# Patient Record
Sex: FEMALE | Race: WHITE | NOT HISPANIC OR LATINO
[De-identification: names, ages, dates, MRNs, and addresses within clinical notes are randomized per-mention and may not be internally consistent; named-entity substitution may affect disease eponyms.]

---

## 2017-01-11 ENCOUNTER — APPOINTMENT (OUTPATIENT)
Dept: FAMILY MEDICINE | Facility: CLINIC | Age: 77
End: 2017-01-11

## 2017-01-11 VITALS
SYSTOLIC BLOOD PRESSURE: 144 MMHG | HEART RATE: 86 BPM | BODY MASS INDEX: 31.15 KG/M2 | DIASTOLIC BLOOD PRESSURE: 86 MMHG | TEMPERATURE: 98.2 F | OXYGEN SATURATION: 99 % | HEIGHT: 61 IN | WEIGHT: 165 LBS

## 2017-01-23 ENCOUNTER — APPOINTMENT (OUTPATIENT)
Dept: FAMILY MEDICINE | Facility: CLINIC | Age: 77
End: 2017-01-23

## 2017-08-10 ENCOUNTER — APPOINTMENT (OUTPATIENT)
Dept: FAMILY MEDICINE | Facility: CLINIC | Age: 77
End: 2017-08-10
Payer: MEDICARE

## 2017-08-10 VITALS
BODY MASS INDEX: 31.34 KG/M2 | HEIGHT: 61 IN | WEIGHT: 166 LBS | HEART RATE: 70 BPM | SYSTOLIC BLOOD PRESSURE: 148 MMHG | DIASTOLIC BLOOD PRESSURE: 90 MMHG | OXYGEN SATURATION: 98 %

## 2017-08-10 PROCEDURE — 99214 OFFICE O/P EST MOD 30 MIN: CPT

## 2017-08-10 RX ORDER — CHLORHEXIDINE GLUCONATE, 0.12% ORAL RINSE 1.2 MG/ML
0.12 SOLUTION DENTAL
Qty: 473 | Refills: 0 | Status: COMPLETED | COMMUNITY
Start: 2017-05-04

## 2017-08-10 RX ORDER — AMOXICILLIN 875 MG/1
875 TABLET, FILM COATED ORAL
Qty: 14 | Refills: 0 | Status: COMPLETED | COMMUNITY
Start: 2017-05-04

## 2017-08-14 ENCOUNTER — MEDICATION RENEWAL (OUTPATIENT)
Age: 77
End: 2017-08-14

## 2017-08-14 DIAGNOSIS — M79.2 NEURALGIA AND NEURITIS, UNSPECIFIED: ICD-10-CM

## 2017-08-18 ENCOUNTER — APPOINTMENT (OUTPATIENT)
Dept: FAMILY MEDICINE | Facility: CLINIC | Age: 77
End: 2017-08-18
Payer: MEDICARE

## 2017-08-18 VITALS
OXYGEN SATURATION: 95 % | BODY MASS INDEX: 31.53 KG/M2 | HEART RATE: 67 BPM | HEIGHT: 61 IN | DIASTOLIC BLOOD PRESSURE: 76 MMHG | SYSTOLIC BLOOD PRESSURE: 130 MMHG | WEIGHT: 167 LBS

## 2017-08-18 VITALS — DIASTOLIC BLOOD PRESSURE: 70 MMHG | SYSTOLIC BLOOD PRESSURE: 105 MMHG

## 2017-08-18 PROCEDURE — 99214 OFFICE O/P EST MOD 30 MIN: CPT

## 2017-08-21 ENCOUNTER — APPOINTMENT (OUTPATIENT)
Dept: RADIOLOGY | Facility: CLINIC | Age: 77
End: 2017-08-21
Payer: MEDICARE

## 2017-08-21 ENCOUNTER — OUTPATIENT (OUTPATIENT)
Dept: OUTPATIENT SERVICES | Facility: HOSPITAL | Age: 77
LOS: 1 days | End: 2017-08-21
Payer: MEDICARE

## 2017-08-21 ENCOUNTER — APPOINTMENT (OUTPATIENT)
Dept: ULTRASOUND IMAGING | Facility: CLINIC | Age: 77
End: 2017-08-21
Payer: MEDICARE

## 2017-08-21 DIAGNOSIS — Z00.8 ENCOUNTER FOR OTHER GENERAL EXAMINATION: ICD-10-CM

## 2017-08-21 DIAGNOSIS — R10.9 UNSPECIFIED ABDOMINAL PAIN: ICD-10-CM

## 2017-08-21 DIAGNOSIS — R07.81 PLEURODYNIA: ICD-10-CM

## 2017-08-21 PROCEDURE — 71100 X-RAY EXAM RIBS UNI 2 VIEWS: CPT | Mod: 26,RT

## 2017-08-21 PROCEDURE — 76700 US EXAM ABDOM COMPLETE: CPT

## 2017-08-21 PROCEDURE — 71100 X-RAY EXAM RIBS UNI 2 VIEWS: CPT

## 2017-08-21 PROCEDURE — 76700 US EXAM ABDOM COMPLETE: CPT | Mod: 26

## 2017-08-22 ENCOUNTER — MEDICATION RENEWAL (OUTPATIENT)
Age: 77
End: 2017-08-22

## 2017-08-31 ENCOUNTER — APPOINTMENT (OUTPATIENT)
Dept: FAMILY MEDICINE | Facility: CLINIC | Age: 77
End: 2017-08-31
Payer: MEDICARE

## 2017-08-31 VITALS
HEIGHT: 61 IN | SYSTOLIC BLOOD PRESSURE: 122 MMHG | BODY MASS INDEX: 31.53 KG/M2 | HEART RATE: 72 BPM | WEIGHT: 167 LBS | DIASTOLIC BLOOD PRESSURE: 80 MMHG | OXYGEN SATURATION: 95 %

## 2017-08-31 DIAGNOSIS — N28.9 DISORDER OF KIDNEY AND URETER, UNSPECIFIED: ICD-10-CM

## 2017-08-31 PROCEDURE — 99214 OFFICE O/P EST MOD 30 MIN: CPT | Mod: 25

## 2017-08-31 PROCEDURE — 36415 COLL VENOUS BLD VENIPUNCTURE: CPT

## 2017-08-31 RX ORDER — TRAMADOL HYDROCHLORIDE 50 MG/1
50 TABLET, COATED ORAL
Qty: 30 | Refills: 0 | Status: COMPLETED | COMMUNITY
Start: 2017-08-14 | End: 2017-08-31

## 2017-08-31 RX ORDER — VALACYCLOVIR 1 G/1
1 TABLET, FILM COATED ORAL 3 TIMES DAILY
Qty: 21 | Refills: 0 | Status: COMPLETED | COMMUNITY
Start: 2017-08-10 | End: 2017-08-31

## 2017-08-31 RX ORDER — PREDNISONE 20 MG/1
20 TABLET ORAL
Qty: 7 | Refills: 0 | Status: COMPLETED | COMMUNITY
Start: 2017-08-22 | End: 2017-08-31

## 2017-09-01 LAB
25(OH)D3 SERPL-MCNC: 18.6 NG/ML
ALBUMIN SERPL ELPH-MCNC: 3.5 G/DL
ALP BLD-CCNC: 115 U/L
ALT SERPL-CCNC: 9 U/L
ANION GAP SERPL CALC-SCNC: 15 MMOL/L
AST SERPL-CCNC: 15 U/L
BILIRUB SERPL-MCNC: 0.5 MG/DL
BUN SERPL-MCNC: 17 MG/DL
CALCIUM SERPL-MCNC: 8.6 MG/DL
CHLORIDE SERPL-SCNC: 105 MMOL/L
CHOLEST SERPL-MCNC: 227 MG/DL
CHOLEST/HDLC SERPL: 4.9 RATIO
CO2 SERPL-SCNC: 21 MMOL/L
CREAT SERPL-MCNC: 1.27 MG/DL
GGT SERPL-CCNC: 11 U/L
GLUCOSE SERPL-MCNC: 235 MG/DL
HDLC SERPL-MCNC: 46 MG/DL
LDLC SERPL CALC-MCNC: 147 MG/DL
POTASSIUM SERPL-SCNC: 4.1 MMOL/L
PROT SERPL-MCNC: 6.6 G/DL
SODIUM SERPL-SCNC: 141 MMOL/L
T3 SERPL-MCNC: 82 NG/DL
T4 SERPL-MCNC: 6.2 UG/DL
TRIGL SERPL-MCNC: 171 MG/DL
TSH SERPL-ACNC: 1.47 UIU/ML
URATE SERPL-MCNC: 4.9 MG/DL

## 2017-09-11 LAB
BASOPHILS # BLD AUTO: 0.05 K/UL
BASOPHILS NFR BLD AUTO: 0.8 %
EOSINOPHIL # BLD AUTO: 0.28 K/UL
EOSINOPHIL NFR BLD AUTO: 4.4 %
HCT VFR BLD CALC: 39.9 %
HGB BLD-MCNC: 11.8 G/DL
IMM GRANULOCYTES NFR BLD AUTO: 0.2 %
LYMPHOCYTES # BLD AUTO: 1.57 K/UL
LYMPHOCYTES NFR BLD AUTO: 24.7 %
MAN DIFF?: NORMAL
MCHC RBC-ENTMCNC: 26.7 PG
MCHC RBC-ENTMCNC: 29.6 GM/DL
MCV RBC AUTO: 90.3 FL
MONOCYTES # BLD AUTO: 0.47 K/UL
MONOCYTES NFR BLD AUTO: 7.4 %
NEUTROPHILS # BLD AUTO: 3.97 K/UL
NEUTROPHILS NFR BLD AUTO: 62.5 %
PLATELET # BLD AUTO: 238 K/UL
RBC # BLD: 4.42 M/UL
RBC # FLD: 16 %
WBC # FLD AUTO: 6.35 K/UL

## 2017-09-27 ENCOUNTER — RX RENEWAL (OUTPATIENT)
Age: 77
End: 2017-09-27

## 2017-11-01 ENCOUNTER — APPOINTMENT (OUTPATIENT)
Dept: FAMILY MEDICINE | Facility: CLINIC | Age: 77
End: 2017-11-01
Payer: MEDICARE

## 2017-11-01 VITALS
DIASTOLIC BLOOD PRESSURE: 76 MMHG | HEART RATE: 66 BPM | HEIGHT: 61 IN | BODY MASS INDEX: 30.58 KG/M2 | OXYGEN SATURATION: 98 % | SYSTOLIC BLOOD PRESSURE: 126 MMHG | WEIGHT: 162 LBS

## 2017-11-01 PROCEDURE — 36415 COLL VENOUS BLD VENIPUNCTURE: CPT

## 2017-11-01 PROCEDURE — 99214 OFFICE O/P EST MOD 30 MIN: CPT | Mod: 25

## 2017-11-02 LAB — HBA1C MFR BLD HPLC: 5.8 %

## 2017-11-03 ENCOUNTER — APPOINTMENT (OUTPATIENT)
Dept: FAMILY MEDICINE | Facility: CLINIC | Age: 77
End: 2017-11-03

## 2017-11-14 ENCOUNTER — APPOINTMENT (OUTPATIENT)
Dept: FAMILY MEDICINE | Facility: CLINIC | Age: 77
End: 2017-11-14
Payer: MEDICARE

## 2017-11-14 VITALS
BODY MASS INDEX: 29.83 KG/M2 | OXYGEN SATURATION: 98 % | DIASTOLIC BLOOD PRESSURE: 80 MMHG | HEIGHT: 61 IN | HEART RATE: 68 BPM | SYSTOLIC BLOOD PRESSURE: 132 MMHG | WEIGHT: 158 LBS

## 2017-11-14 DIAGNOSIS — B02.29 OTHER POSTHERPETIC NERVOUS SYSTEM INVOLVEMENT: ICD-10-CM

## 2017-11-14 PROCEDURE — 99214 OFFICE O/P EST MOD 30 MIN: CPT

## 2017-11-14 RX ORDER — VALACYCLOVIR 1 G/1
1 TABLET, FILM COATED ORAL 3 TIMES DAILY
Qty: 21 | Refills: 0 | Status: COMPLETED | COMMUNITY
Start: 2017-11-01 | End: 2017-11-14

## 2017-11-14 RX ORDER — METHYLPREDNISOLONE 4 MG/1
4 TABLET ORAL
Qty: 1 | Refills: 0 | Status: COMPLETED | COMMUNITY
Start: 2017-11-01 | End: 2017-11-14

## 2017-12-24 ENCOUNTER — RX RENEWAL (OUTPATIENT)
Age: 77
End: 2017-12-24

## 2018-01-23 ENCOUNTER — APPOINTMENT (OUTPATIENT)
Dept: FAMILY MEDICINE | Facility: CLINIC | Age: 78
End: 2018-01-23
Payer: MEDICARE

## 2018-01-23 ENCOUNTER — RX RENEWAL (OUTPATIENT)
Age: 78
End: 2018-01-23

## 2018-01-23 VITALS
SYSTOLIC BLOOD PRESSURE: 138 MMHG | HEIGHT: 61 IN | OXYGEN SATURATION: 94 % | BODY MASS INDEX: 29.83 KG/M2 | WEIGHT: 158 LBS | TEMPERATURE: 98.5 F | DIASTOLIC BLOOD PRESSURE: 70 MMHG | HEART RATE: 73 BPM

## 2018-01-23 PROCEDURE — 99214 OFFICE O/P EST MOD 30 MIN: CPT

## 2018-01-23 RX ORDER — PREGABALIN 50 MG/1
50 CAPSULE ORAL
Qty: 30 | Refills: 0 | Status: DISCONTINUED | COMMUNITY
Start: 2017-11-14 | End: 2018-01-23

## 2018-03-26 ENCOUNTER — RX RENEWAL (OUTPATIENT)
Age: 78
End: 2018-03-26

## 2018-06-14 ENCOUNTER — RX RENEWAL (OUTPATIENT)
Age: 78
End: 2018-06-14

## 2018-07-16 ENCOUNTER — APPOINTMENT (OUTPATIENT)
Dept: MRI IMAGING | Facility: CLINIC | Age: 78
End: 2018-07-16

## 2018-07-16 ENCOUNTER — OUTPATIENT (OUTPATIENT)
Dept: OUTPATIENT SERVICES | Facility: HOSPITAL | Age: 78
LOS: 1 days | End: 2018-07-16
Payer: MEDICARE

## 2018-07-16 DIAGNOSIS — Z00.8 ENCOUNTER FOR OTHER GENERAL EXAMINATION: ICD-10-CM

## 2018-07-16 PROCEDURE — 73721 MRI JNT OF LWR EXTRE W/O DYE: CPT | Mod: 26,RT

## 2018-07-16 PROCEDURE — 73721 MRI JNT OF LWR EXTRE W/O DYE: CPT

## 2018-07-23 ENCOUNTER — APPOINTMENT (OUTPATIENT)
Dept: FAMILY MEDICINE | Facility: CLINIC | Age: 78
End: 2018-07-23
Payer: MEDICARE

## 2018-07-23 VITALS
SYSTOLIC BLOOD PRESSURE: 116 MMHG | BODY MASS INDEX: 28.89 KG/M2 | DIASTOLIC BLOOD PRESSURE: 68 MMHG | WEIGHT: 153 LBS | TEMPERATURE: 97.5 F | HEART RATE: 81 BPM | HEIGHT: 61 IN | OXYGEN SATURATION: 98 %

## 2018-07-23 PROCEDURE — 99214 OFFICE O/P EST MOD 30 MIN: CPT

## 2018-07-23 RX ORDER — METHYLPREDNISOLONE 4 MG/1
4 TABLET ORAL
Qty: 1 | Refills: 0 | Status: DISCONTINUED | COMMUNITY
Start: 2018-01-23 | End: 2018-07-23

## 2018-07-23 RX ORDER — FLUTICASONE PROPIONATE 50 UG/1
50 SPRAY, METERED NASAL DAILY
Qty: 48 | Refills: 5 | Status: DISCONTINUED | COMMUNITY
Start: 2018-01-23 | End: 2018-07-23

## 2018-08-21 ENCOUNTER — APPOINTMENT (OUTPATIENT)
Dept: FAMILY MEDICINE | Facility: CLINIC | Age: 78
End: 2018-08-21
Payer: MEDICARE

## 2018-08-21 VITALS
WEIGHT: 156 LBS | SYSTOLIC BLOOD PRESSURE: 118 MMHG | DIASTOLIC BLOOD PRESSURE: 70 MMHG | BODY MASS INDEX: 29.45 KG/M2 | TEMPERATURE: 98.1 F | HEART RATE: 60 BPM | HEIGHT: 61 IN | OXYGEN SATURATION: 97 %

## 2018-08-21 PROCEDURE — 99214 OFFICE O/P EST MOD 30 MIN: CPT

## 2018-08-22 NOTE — PLAN
[FreeTextEntry1] : recommend oral antihistamine nightly call if cough has not resolved in the next 10-14 days

## 2018-08-22 NOTE — PHYSICAL EXAM

## 2018-08-22 NOTE — HISTORY OF PRESENT ILLNESS
[FreeTextEntry8] : worsening cough [id number] on/off \par \par throaty cough for just over a month\par took antibiotic but no relief\par feels some tightness in throat\par clear phlegm\par used nasal srpay but felt it made cough worse\par no fever or chills\par no dypnea

## 2018-09-13 ENCOUNTER — RX RENEWAL (OUTPATIENT)
Age: 78
End: 2018-09-13

## 2018-11-27 ENCOUNTER — NON-APPOINTMENT (OUTPATIENT)
Age: 78
End: 2018-11-27

## 2018-11-27 ENCOUNTER — APPOINTMENT (OUTPATIENT)
Dept: FAMILY MEDICINE | Facility: CLINIC | Age: 78
End: 2018-11-27
Payer: MEDICARE

## 2018-11-27 VITALS
BODY MASS INDEX: 31.34 KG/M2 | SYSTOLIC BLOOD PRESSURE: 124 MMHG | OXYGEN SATURATION: 98 % | HEIGHT: 61 IN | DIASTOLIC BLOOD PRESSURE: 76 MMHG | WEIGHT: 166 LBS | HEART RATE: 72 BPM

## 2018-11-27 DIAGNOSIS — Z86.79 PERSONAL HISTORY OF OTHER DISEASES OF THE CIRCULATORY SYSTEM: ICD-10-CM
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
## 2018-11-27 DIAGNOSIS — Z23 ENCOUNTER FOR IMMUNIZATION: ICD-10-CM

## 2018-11-27 DIAGNOSIS — R07.81 PLEURODYNIA: ICD-10-CM

## 2018-11-27 DIAGNOSIS — Z86.19 PERSONAL HISTORY OF OTHER INFECTIOUS AND PARASITIC DISEASES: ICD-10-CM

## 2018-11-27 DIAGNOSIS — R42 DIZZINESS AND GIDDINESS: ICD-10-CM

## 2018-11-27 DIAGNOSIS — Z87.898 PERSONAL HISTORY OF OTHER SPECIFIED CONDITIONS: ICD-10-CM

## 2018-11-27 DIAGNOSIS — Z87.09 PERSONAL HISTORY OF OTHER DISEASES OF THE RESPIRATORY SYSTEM: ICD-10-CM

## 2018-11-27 DIAGNOSIS — H60.90 UNSPECIFIED OTITIS EXTERNA, UNSPECIFIED EAR: ICD-10-CM

## 2018-11-27 PROCEDURE — 93000 ELECTROCARDIOGRAM COMPLETE: CPT

## 2018-11-27 PROCEDURE — 90670 PCV13 VACCINE IM: CPT

## 2018-11-27 PROCEDURE — G0439: CPT

## 2018-11-27 PROCEDURE — G0009: CPT

## 2018-11-27 PROCEDURE — 36415 COLL VENOUS BLD VENIPUNCTURE: CPT

## 2018-11-27 RX ORDER — BENZONATATE 100 MG/1
100 CAPSULE ORAL EVERY 8 HOURS
Qty: 30 | Refills: 1 | Status: DISCONTINUED | COMMUNITY
Start: 2018-01-23 | End: 2018-11-27

## 2018-11-27 RX ORDER — NYSTATIN 100000 [USP'U]/ML
100000 SUSPENSION ORAL 3 TIMES DAILY
Qty: 1 | Refills: 1 | Status: DISCONTINUED | COMMUNITY
Start: 2018-08-21 | End: 2018-11-27

## 2018-11-27 RX ORDER — AZITHROMYCIN 250 MG/1
250 TABLET, FILM COATED ORAL
Qty: 1 | Refills: 0 | Status: DISCONTINUED | COMMUNITY
Start: 2018-07-23 | End: 2018-11-27

## 2018-11-28 LAB
25(OH)D3 SERPL-MCNC: 12.9 NG/ML
CHOLEST SERPL-MCNC: 223 MG/DL
CHOLEST/HDLC SERPL: 3.6 RATIO
HBA1C MFR BLD HPLC: 6 %
HCV AB SER QL: NONREACTIVE
HCV S/CO RATIO: 0.14 S/CO
HDLC SERPL-MCNC: 62 MG/DL
LDLC SERPL CALC-MCNC: 138 MG/DL
TRIGL SERPL-MCNC: 117 MG/DL
TSH SERPL-ACNC: 2.41 UIU/ML
URATE SERPL-MCNC: 4.7 MG/DL

## 2018-11-28 NOTE — HEALTH RISK ASSESSMENT
[Good] : ~his/her~  mood as  good [No falls in past year] : Patient reported no falls in the past year [0] : 2) Feeling down, depressed, or hopeless: Not at all (0) [None] : None [With Family] : lives with family [Retired] : retired [Designated Healthcare Proxy] : Designated healthcare proxy [Name: ___] : Health Care Proxy's Name: [unfilled]  [Relationship: ___] : Relationship: [unfilled] [] : No [de-identified] : Dr Lal (ophth) Dr. Castillo (podiatry) [de-identified] : very rare [Change in mental status noted] : No change in mental status noted

## 2018-11-28 NOTE — HISTORY OF PRESENT ILLNESS
[FreeTextEntry1] : [facility] wellness exam  [de-identified] : 78 year old female here for annual wellness exam\par she fells well\par she lives with her daughter\par still drives\par memory and mood are good\par

## 2018-11-28 NOTE — PHYSICAL EXAM

## 2018-11-29 ENCOUNTER — OTHER (OUTPATIENT)
Age: 78
End: 2018-11-29

## 2018-12-21 ENCOUNTER — RX RENEWAL (OUTPATIENT)
Age: 78
End: 2018-12-21

## 2019-03-22 ENCOUNTER — RX RENEWAL (OUTPATIENT)
Age: 79
End: 2019-03-22

## 2019-06-20 ENCOUNTER — OTHER (OUTPATIENT)
Age: 79
End: 2019-06-20

## 2019-06-24 ENCOUNTER — APPOINTMENT (OUTPATIENT)
Dept: ORTHOPEDIC SURGERY | Facility: CLINIC | Age: 79
End: 2019-06-24
Payer: MEDICARE

## 2019-06-24 VITALS
DIASTOLIC BLOOD PRESSURE: 90 MMHG | HEART RATE: 60 BPM | SYSTOLIC BLOOD PRESSURE: 176 MMHG | HEIGHT: 61 IN | WEIGHT: 162 LBS | BODY MASS INDEX: 30.58 KG/M2

## 2019-06-24 PROCEDURE — 73590 X-RAY EXAM OF LOWER LEG: CPT | Mod: LT

## 2019-06-24 PROCEDURE — 99203 OFFICE O/P NEW LOW 30 MIN: CPT

## 2019-06-24 NOTE — DISCUSSION/SUMMARY
[de-identified] : Jyothi is an 79-year-old female with left leg pain secondary to tibialis anterior strain. Today we had a thorough discussion regarding the nature of her symptoms as well as all treatment options. We discussed both operative and nonoperative management. At this time she elects for further nonoperative management with an oral anti-inflammatory and physical therapy. I gave her prescription for meloxicam once daily for 21 days as well as a prescription for physical therapy. I will see her back in 4-6 weeks. If she has no improvement of symptoms we may consider an MRI. She pushes good understanding of the plan and all questions were answered.

## 2019-06-24 NOTE — CONSULT LETTER
[Dear  ___] : Dear  [unfilled], [Consult Letter:] : I had the pleasure of evaluating your patient, [unfilled]. [Please see my note below.] : Please see my note below. [Consult Closing:] : Thank you very much for allowing me to participate in the care of this patient.  If you have any questions, please do not hesitate to contact me. [Sincerely,] : Sincerely, [FreeTextEntry2] : Olive Hughes MD [FreeTextEntry3] : Mitchell Coates DO.\par Sports Medicine \par \par Orthopaedic Surgery\par \par [facility] @ [facility]\par \par [address] \par [address]\[place] [address]\par \par [address] [facility] \[address] \[place] [address]\par \par Tel [phone]\par Fax [phone]\par \par

## 2019-06-24 NOTE — PHYSICAL EXAM
[de-identified] : \par The following radiographs were ordered and read by me during this patients visit. I reviewed each radiograph in detail with the patient and discussed the findings as highlighted below. \par \par 3 views of the tibia and fibula were obtained today that show no fracture, dislocation. There is no degenerative change seen. There is no malalignment. No obvious osseous abnormality. Otherwise unremarkable. [de-identified] : Physical Exam:\par General: Well appearing, no acute distress, A&O\par Neurologic: A&Ox3, No focal deficits\par Head: NCAT without abrasions, lacerations, or ecchymosis to head, face, or scalp\par Eyes: No scleral icterus, no gross abnormalities\par Respiratory: Equal chest wall expansion bilaterally, no accessory muscle use\par Lymphatic: No lymphadenopathy palpated\par Skin: Warm and dry\par Psychiatric: Normal mood and affect\par Left lower extremity: Incision site over knee. Good healing scar over total knee replacement incision. Tenderness and swelling noted at the tibialis anterior muscle belly. No tenderness or swelling noted at the distal portion of the tibialis anterior. Full range of motion of ankle and knee. No swelling noted ankle or knee. No erythema. Knee is stable. Stable ankle exam. No no pain with eversion or inversion. There is pain with dorsiflexion and plantar flexion. Motor and sensation are intact. Dorsalis pedis 2+

## 2019-06-24 NOTE — HISTORY OF PRESENT ILLNESS
[Improving] : improving [___ mths] : [unfilled] month(s) ago [8] : an average pain level of 8/10 [9] : a maximum pain level of 9/10 [Constant] : ~He/She~ states the symptoms seem to be constant [Bending] : worsened by bending [Sitting] : worsened by sitting [Walking] : worsened by walking [None] : No relieving factors are noted [de-identified] : Jyothi is an 79 year old female who presents for initial evaluation of left lower leg pain.  She saw an orthopedic for this issue who sent her to physical therapy.  Her pain has not improved with 2 mos. of physical therapy.  She had an US to rule out DVT.She states that she feels 50% improved since her last visit with orthopedic surgeon. She still does have pain over the tibialis anterior muscle. She feels as if she is unstable on her legs. [de-identified] : lying down

## 2019-06-24 NOTE — REVIEW OF SYSTEMS
[Joint Pain] : joint pain [Joint Stiffness] : joint stiffness [Joint Swelling] : joint swelling [FreeTextEntry9] : left leg [Negative] : Heme/Lymph

## 2019-07-10 ENCOUNTER — APPOINTMENT (OUTPATIENT)
Dept: FAMILY MEDICINE | Facility: CLINIC | Age: 79
End: 2019-07-10
Payer: MEDICARE

## 2019-07-10 VITALS — SYSTOLIC BLOOD PRESSURE: 138 MMHG | DIASTOLIC BLOOD PRESSURE: 80 MMHG

## 2019-07-10 VITALS
OXYGEN SATURATION: 98 % | DIASTOLIC BLOOD PRESSURE: 74 MMHG | TEMPERATURE: 97.5 F | WEIGHT: 168 LBS | BODY MASS INDEX: 31.72 KG/M2 | HEART RATE: 66 BPM | HEIGHT: 61 IN | SYSTOLIC BLOOD PRESSURE: 162 MMHG

## 2019-07-10 PROCEDURE — 99214 OFFICE O/P EST MOD 30 MIN: CPT

## 2019-07-10 RX ORDER — METHYLPREDNISOLONE 4 MG/1
4 TABLET ORAL
Qty: 1 | Refills: 0 | Status: COMPLETED | COMMUNITY
Start: 2019-06-24 | End: 2019-07-10

## 2019-07-10 NOTE — ADDENDUM
[FreeTextEntry1] : I, Darling Nieto acting as a scribe for Dr. Olive Hughes on Jul 10, 2019  at 12:02 PM\par

## 2019-07-10 NOTE — PLAN
[FreeTextEntry1] : - Continue Meloxicam for inflammation\par - Continue stretching/ice\par - Vitamin D refill ordered\par - Follow up in four months

## 2019-07-10 NOTE — END OF VISIT
[FreeTextEntry3] : Medical record entries made by the scribe today today, were at my direction and personally dictated to them by me, Dr. Olive Hughes on Jul 10, 2019. I have reviewed the chart and agree that the record accurately reflects my personal performance of the history, physical exam, assessment, and plan.\par \par

## 2019-07-10 NOTE — HISTORY OF PRESENT ILLNESS
[FreeTextEntry1] : F/u on HTN, constant left leg pain  [de-identified] : ADILSON is a 79 year female here for f/u on HTN. BP today reads 162/74. She attributes a high pressure to weight gain. Currently taking Amlodipine 5 mg, Metoprolol 25 mg. Pt currently presents c/o constant LT leg pain. F/u with  for leg pain. All tests were unremarkable, he feels it may be tendonitis. Pt reports that Meloxicam is not helping. Steroids was the only treatment that provided relief. States she has been stretching, using heat/ice. Reports that she cant stand/sit/walk too long.

## 2019-07-12 ENCOUNTER — FORM ENCOUNTER (OUTPATIENT)
Age: 79
End: 2019-07-12

## 2019-07-13 ENCOUNTER — OUTPATIENT (OUTPATIENT)
Dept: OUTPATIENT SERVICES | Facility: HOSPITAL | Age: 79
LOS: 1 days | End: 2019-07-13
Payer: MEDICARE

## 2019-07-13 ENCOUNTER — APPOINTMENT (OUTPATIENT)
Dept: MRI IMAGING | Facility: CLINIC | Age: 79
End: 2019-07-13
Payer: MEDICARE

## 2019-07-13 DIAGNOSIS — Z00.8 ENCOUNTER FOR OTHER GENERAL EXAMINATION: ICD-10-CM

## 2019-07-13 DIAGNOSIS — M76.812 ANTERIOR TIBIAL SYNDROME, LEFT LEG: ICD-10-CM

## 2019-07-13 PROCEDURE — 73718 MRI LOWER EXTREMITY W/O DYE: CPT

## 2019-07-13 PROCEDURE — 73718 MRI LOWER EXTREMITY W/O DYE: CPT | Mod: 26,LT

## 2019-08-12 ENCOUNTER — APPOINTMENT (OUTPATIENT)
Dept: ORTHOPEDIC SURGERY | Facility: CLINIC | Age: 79
End: 2019-08-12
Payer: MEDICARE

## 2019-08-12 VITALS
HEART RATE: 70 BPM | HEIGHT: 61 IN | SYSTOLIC BLOOD PRESSURE: 159 MMHG | BODY MASS INDEX: 31.72 KG/M2 | DIASTOLIC BLOOD PRESSURE: 84 MMHG | WEIGHT: 168 LBS

## 2019-08-12 PROCEDURE — 99213 OFFICE O/P EST LOW 20 MIN: CPT

## 2019-09-17 ENCOUNTER — RX RENEWAL (OUTPATIENT)
Age: 79
End: 2019-09-17

## 2019-10-23 ENCOUNTER — APPOINTMENT (OUTPATIENT)
Dept: FAMILY MEDICINE | Facility: CLINIC | Age: 79
End: 2019-10-23
Payer: MEDICARE

## 2019-10-23 VITALS
HEIGHT: 61 IN | OXYGEN SATURATION: 98 % | DIASTOLIC BLOOD PRESSURE: 92 MMHG | TEMPERATURE: 97.8 F | WEIGHT: 172 LBS | BODY MASS INDEX: 32.47 KG/M2 | HEART RATE: 83 BPM | SYSTOLIC BLOOD PRESSURE: 144 MMHG

## 2019-10-23 PROCEDURE — 99214 OFFICE O/P EST MOD 30 MIN: CPT

## 2019-10-23 NOTE — ADDENDUM
[FreeTextEntry1] : I, Darling Nieto acting as a scribe for Dr. Olive Hughes on Oct 23, 2019  at 8:23 AM\par

## 2019-10-23 NOTE — HISTORY OF PRESENT ILLNESS
[FreeTextEntry8] : ADILSON is a 79 year female here c/o LT leg calf pain for the past two weeks. Pt reports that she had an MRI and followed up with  7/19. He encouraged her to stretch/exercise the leg and use of shoe inserts. About 2 weeks ago, she began experiencing severe pain. She is unable to walk well, can not lay down. Has tried Advil/CBD oil/ice/heat with no relief. Sharp, constant pain. Denies any triggers. Followed up with podiatrist in past for RT leg and was given brace.

## 2019-10-23 NOTE — PHYSICAL EXAM
[No Acute Distress] : no acute distress [Well Nourished] : well nourished [Well Developed] : well developed [PERRL] : pupils equal round and reactive to light [Normal Sclera/Conjunctiva] : normal sclera/conjunctiva [Well-Appearing] : well-appearing [Normal Outer Ear/Nose] : the outer ears and nose were normal in appearance [EOMI] : extraocular movements intact [No JVD] : no jugular venous distention [No Lymphadenopathy] : no lymphadenopathy [Normal Oropharynx] : the oropharynx was normal [Supple] : supple [Thyroid Normal, No Nodules] : the thyroid was normal and there were no nodules present [No Accessory Muscle Use] : no accessory muscle use [No Respiratory Distress] : no respiratory distress  [Clear to Auscultation] : lungs were clear to auscultation bilaterally [Normal Rate] : normal rate  [Regular Rhythm] : with a regular rhythm [Normal S1, S2] : normal S1 and S2 [No Murmur] : no murmur heard [No Carotid Bruits] : no carotid bruits [No Abdominal Bruit] : a ~M bruit was not heard ~T in the abdomen [No Varicosities] : no varicosities [Pedal Pulses Present] : the pedal pulses are present [No Edema] : there was no peripheral edema [No Extremity Clubbing/Cyanosis] : no extremity clubbing/cyanosis [No Palpable Aorta] : no palpable aorta [Soft] : abdomen soft [Non-distended] : non-distended [Non Tender] : non-tender [No Masses] : no abdominal mass palpated [No HSM] : no HSM [Normal Bowel Sounds] : normal bowel sounds [Normal Posterior Cervical Nodes] : no posterior cervical lymphadenopathy [Normal Anterior Cervical Nodes] : no anterior cervical lymphadenopathy [No CVA Tenderness] : no CVA  tenderness [No Spinal Tenderness] : no spinal tenderness [No Joint Swelling] : no joint swelling [No Rash] : no rash [Grossly Normal Strength/Tone] : grossly normal strength/tone [Normal Gait] : normal gait [Coordination Grossly Intact] : coordination grossly intact [No Focal Deficits] : no focal deficits [Deep Tendon Reflexes (DTR)] : deep tendon reflexes were 2+ and symmetric [Normal Affect] : the affect was normal [Normal Insight/Judgement] : insight and judgment were intact

## 2019-10-23 NOTE — END OF VISIT
[FreeTextEntry3] : Medical record entries made by the scribe today today, were at my direction and personally dictated to them by me, Dr. Olive Hughes on Oct 23, 2019. I have reviewed the chart and agree that the record accurately reflects my personal performance of the history, physical exam, assessment, and plan.\par \par

## 2019-10-23 NOTE — PLAN
[FreeTextEntry1] : - Blood work\par - Furosemide 20 mg ordered\par - Potassium ordered\par - Tramadol ordered \par - Follow up next week

## 2019-10-24 LAB
ALBUMIN SERPL ELPH-MCNC: 4 G/DL
ALP BLD-CCNC: 107 U/L
ALT SERPL-CCNC: 12 U/L
ANION GAP SERPL CALC-SCNC: 12 MMOL/L
AST SERPL-CCNC: 18 U/L
BASOPHILS # BLD AUTO: 0.05 K/UL
BASOPHILS NFR BLD AUTO: 0.9 %
BILIRUB SERPL-MCNC: 0.3 MG/DL
BUN SERPL-MCNC: 23 MG/DL
CALCIUM SERPL-MCNC: 9.2 MG/DL
CHLORIDE SERPL-SCNC: 109 MMOL/L
CO2 SERPL-SCNC: 20 MMOL/L
CREAT SERPL-MCNC: 1.37 MG/DL
EOSINOPHIL # BLD AUTO: 0.33 K/UL
EOSINOPHIL NFR BLD AUTO: 5.8 %
GLUCOSE SERPL-MCNC: 108 MG/DL
HCT VFR BLD CALC: 40.3 %
HGB BLD-MCNC: 12.1 G/DL
IMM GRANULOCYTES NFR BLD AUTO: 0.2 %
LYMPHOCYTES # BLD AUTO: 1.58 K/UL
LYMPHOCYTES NFR BLD AUTO: 27.6 %
MAN DIFF?: NORMAL
MCHC RBC-ENTMCNC: 28.1 PG
MCHC RBC-ENTMCNC: 30 GM/DL
MCV RBC AUTO: 93.7 FL
MONOCYTES # BLD AUTO: 0.54 K/UL
MONOCYTES NFR BLD AUTO: 9.4 %
NEUTROPHILS # BLD AUTO: 3.22 K/UL
NEUTROPHILS NFR BLD AUTO: 56.1 %
PLATELET # BLD AUTO: 222 K/UL
POTASSIUM SERPL-SCNC: 4.3 MMOL/L
PROT SERPL-MCNC: 6.4 G/DL
RBC # BLD: 4.3 M/UL
RBC # FLD: 14.6 %
SODIUM SERPL-SCNC: 141 MMOL/L
WBC # FLD AUTO: 5.73 K/UL

## 2019-10-30 ENCOUNTER — APPOINTMENT (OUTPATIENT)
Dept: FAMILY MEDICINE | Facility: CLINIC | Age: 79
End: 2019-10-30
Payer: MEDICARE

## 2019-10-30 VITALS
WEIGHT: 167 LBS | HEART RATE: 70 BPM | DIASTOLIC BLOOD PRESSURE: 90 MMHG | TEMPERATURE: 98 F | OXYGEN SATURATION: 98 % | BODY MASS INDEX: 31.53 KG/M2 | SYSTOLIC BLOOD PRESSURE: 142 MMHG | HEIGHT: 61 IN

## 2019-10-30 PROCEDURE — 36415 COLL VENOUS BLD VENIPUNCTURE: CPT

## 2019-10-30 PROCEDURE — 99214 OFFICE O/P EST MOD 30 MIN: CPT | Mod: 25

## 2019-10-30 NOTE — END OF VISIT
[FreeTextEntry3] : Medical record entries made by the scribe today today, were at my direction and personally dictated to them by me, Dr. Olive Hughes on Oct 30, 2019. I have reviewed the chart and agree that the record accurately reflects my personal performance of the history, physical exam, assessment, and plan.\par \par

## 2019-10-30 NOTE — PLAN
[FreeTextEntry1] : - Blood work today\par - Take Furosemide 40 mg a day until Sunday\par - Take Potassium x1 day\par - Percocet ordered \par - 11/4/19 return to Furosemide 20 mg\par - Weigh in QD\par - Call office and update with weights\par - Follow up next week

## 2019-10-30 NOTE — ADDENDUM
[FreeTextEntry1] : I, Darling Nieto acting as a scribe for Dr. Olive Hughes on Oct 30, 2019  at 9:22 AM\par

## 2019-10-30 NOTE — HISTORY OF PRESENT ILLNESS
[FreeTextEntry1] : 1 week f/u x left leg tendonitis, pt does not feel better   [de-identified] : ADILSON is a 79 year female here for f/u. Reports pain is still present, but not as bad. States 5/6 pain level. Has not taken Percocet today. Taking Percocet PRN, mostly in the afternoon due to it causing sleepiness. Taking Furosemide 20 mg daily and Potassium 20 meq daily. Reports she takes it in the morning. Following up with ortho 11/4/19. States that a week before the pain started, she was at an event and was up dancing for a few hours. Pt is wondering if that could have caused the pain. \par

## 2019-10-30 NOTE — REVIEW OF SYSTEMS
[Lower Ext Edema] : lower extremity edema [Joint Pain] : joint pain [Joint Swelling] : joint swelling [Muscle Pain] : muscle pain

## 2019-11-01 LAB
ANION GAP SERPL CALC-SCNC: 13 MMOL/L
BUN SERPL-MCNC: 18 MG/DL
CALCIUM SERPL-MCNC: 9.4 MG/DL
CHLORIDE SERPL-SCNC: 106 MMOL/L
CO2 SERPL-SCNC: 21 MMOL/L
CREAT SERPL-MCNC: 1.63 MG/DL
GLUCOSE SERPL-MCNC: 136 MG/DL
POTASSIUM SERPL-SCNC: 4.5 MMOL/L
SODIUM SERPL-SCNC: 140 MMOL/L

## 2019-11-04 ENCOUNTER — APPOINTMENT (OUTPATIENT)
Dept: ORTHOPEDIC SURGERY | Facility: CLINIC | Age: 79
End: 2019-11-04
Payer: MEDICARE

## 2019-11-04 VITALS
HEIGHT: 61 IN | BODY MASS INDEX: 31.53 KG/M2 | HEART RATE: 99 BPM | WEIGHT: 167 LBS | SYSTOLIC BLOOD PRESSURE: 123 MMHG | DIASTOLIC BLOOD PRESSURE: 75 MMHG

## 2019-11-04 PROCEDURE — 99213 OFFICE O/P EST LOW 20 MIN: CPT

## 2019-11-04 NOTE — DISCUSSION/SUMMARY
[de-identified] : Jyothi is an 79-year-old female who comes in for followup of her tibialis anterior pain. She recently received an MRI which shows inflammation over the peroneal muscles and tibialis anterior. At last evaluation I recommended continued physical therapy continued exercises. She does have pes planovalgus foot which is causing her irritation and tibialis anterior. I suggested over-the-counter orthotics at last evaluation which she has been wearing on a daily basis with minimal relief. Due to this, I recommended she see my partner, Dr Zhang for evaluation. I will see her back p.r.n. She gives us good understanding of the plan and all questions were answered.

## 2019-11-04 NOTE — PHYSICAL EXAM
[de-identified] : Physical Exam:\par General: Well appearing, no acute distress, A&O\par Neurologic: A&Ox3, No focal deficits\par Head: NCAT without abrasions, lacerations, or ecchymosis to head, face, or scalp\par Eyes: No scleral icterus, no gross abnormalities\par Respiratory: Equal chest wall expansion bilaterally, no accessory muscle use\par Lymphatic: No lymphadenopathy palpated\par Skin: Warm and dry\par Psychiatric: Normal mood and affect\par Left lower extremity: Incision site over knee. Good healing scar over total knee replacement incision. Tenderness and swelling noted at the tibialis anterior muscle belly. No tenderness or swelling noted at the distal portion of the tibialis anterior. Full range of motion of ankle and knee. No swelling noted ankle or knee. No erythema. Knee is stable. Stable ankle exam. No no pain with eversion or inversion. There is pain with dorsiflexion and plantar flexion. Motor and sensation are intact. Dorsalis pedis 2+ [de-identified] : \par The following radiographs were ordered and read by me during this patients visit. I reviewed each radiograph in detail with the patient and discussed the findings as highlighted below. \par \par 3 views of the tibia and fibula were obtained today that show no fracture, dislocation. There is no degenerative change seen. There is no malalignment. No obvious osseous abnormality. Otherwise unremarkable.

## 2019-11-04 NOTE — PHYSICAL EXAM
[de-identified] : Physical Exam:\par General: Well appearing, no acute distress, A&O\par Neurologic: A&Ox3, No focal deficits\par Head: NCAT without abrasions, lacerations, or ecchymosis to head, face, or scalp\par Eyes: No scleral icterus, no gross abnormalities\par Respiratory: Equal chest wall expansion bilaterally, no accessory muscle use\par Lymphatic: No lymphadenopathy palpated\par Skin: Warm and dry\par Psychiatric: Normal mood and affect\par \par Left lower extremity: Incision site over knee. Good healing scar over total knee replacement incision. Tenderness and swelling noted at the tibialis anterior muscle belly. No tenderness or swelling noted at the distal portion of the tibialis anterior. Full range of motion of ankle and knee. No swelling noted ankle or knee. No erythema. Knee is stable. Stable ankle exam. No no pain with eversion or inversion. There is pain with dorsiflexion and plantar flexion. Motor and sensation are intact. Dorsalis pedis 2+

## 2019-11-04 NOTE — HISTORY OF PRESENT ILLNESS
[Improving] : improving [Constant] : ~He/She~ states the symptoms seem to be constant [None] : No relieving factors are noted [de-identified] : Jyothi is an 79 year old female who presents for followup evaluation of left lower leg pain.  She saw an orthopedic for this issue who sent her to physical therapy.  Her pain has not improved with 2 mos. of physical therapy.  She had an US to rule out DVT.  She had an MRI at a [facility] imaging facility.  No evidence of muscle strain or tear of the proximal calf muscles.  Subcutaneous edema adjacent to the proximal peroneal and lateral gastrocnemius muscles. Mild faint edema within the distal peroneus musculature. \par \par Currently patient endorses improved pain from her initial evaluation.  She states that approximately 3-4 weeks ago she was dancing at a luncheon.  One week following, she endorsed increased left anterior tibialis pain and swelling.  Due to inability to get an immediate appointment with me she followed up with her PCP, Olive Miller.  Dr. Miller prescribed her a water pill which relieved her pain one week later.  She will followup with her again tomorrow.

## 2019-11-04 NOTE — REVIEW OF SYSTEMS
[Joint Pain] : joint pain [Joint Stiffness] : joint stiffness [Joint Swelling] : joint swelling [Negative] : Heme/Lymph [FreeTextEntry9] : left leg

## 2019-11-04 NOTE — DISCUSSION/SUMMARY
[de-identified] : Jyothi is an 79-year-old female who comes in for followup of her tibialis anterior pain. She recently received an MRI which shows inflammation over the peroneal muscles and tibialis anterior. At this time I recommend continued physical therapy continued exercises. She does have pes planovalgus foot which is causing her irritation and tibialis anterior. I suggested over-the-counter orthotics I gave her 3 options today. I will see her back p.r.n. She gives us good understanding of the plan and all questions were answered.

## 2019-11-04 NOTE — HISTORY OF PRESENT ILLNESS
[Improving] : improving [___ mths] : [unfilled] month(s) ago [8] : an average pain level of 8/10 [9] : a maximum pain level of 9/10 [Constant] : ~He/She~ states the symptoms seem to be constant [Bending] : worsened by bending [Sitting] : worsened by sitting [Walking] : worsened by walking [None] : No relieving factors are noted [de-identified] : Jyothi is an 79 year old female who presents for initial evaluation of left lower leg pain.  She saw an orthopedic for this issue who sent her to physical therapy.  Her pain has not improved with 2 mos. of physical therapy.  She had an US to rule out DVT. \par \par Patient endorses decreased lower extremity pain.  She had an MRI at a [facility] imaging facility.  No evidence of muscle strain or tear of the proximal calf muscles. \par  Subcutaneous edema adjacent to the proximal peroneal and lateral gastrocnemius muscles. Mild faint edema within the distal peroneus musculature.  [de-identified] : lying down

## 2019-11-05 ENCOUNTER — APPOINTMENT (OUTPATIENT)
Dept: FAMILY MEDICINE | Facility: CLINIC | Age: 79
End: 2019-11-05
Payer: MEDICARE

## 2019-11-05 VITALS
OXYGEN SATURATION: 98 % | HEIGHT: 61 IN | WEIGHT: 167 LBS | DIASTOLIC BLOOD PRESSURE: 80 MMHG | BODY MASS INDEX: 31.53 KG/M2 | SYSTOLIC BLOOD PRESSURE: 134 MMHG | HEART RATE: 69 BPM

## 2019-11-05 PROCEDURE — 99214 OFFICE O/P EST MOD 30 MIN: CPT

## 2019-11-05 NOTE — HISTORY OF PRESENT ILLNESS
[FreeTextEntry1] : follow up on LE edema  [de-identified] : ADILSON is a 79 year female here for f/u on LE edema. Followed up with ortho  11/4/19 and he encouraged orthotics which she has been using. Off note, he encouraged her to follow up with . LE edema has improved since last visit. Taking Furosemide 20 mg once day. Has not been taking Percocet.

## 2019-11-05 NOTE — END OF VISIT
[FreeTextEntry3] : Medical record entries made by the scribe today today, were at my direction and personally dictated to them by me, Dr. Olive Hughes on Nov 05, 2019. I have reviewed the chart and agree that the record accurately reflects my personal performance of the history, physical exam, assessment, and plan.\par \par

## 2019-11-05 NOTE — ADDENDUM
[FreeTextEntry1] : I, Darling Nieto acting as a scribe for Dr. Olive Hughes on Nov 05, 2019  at 10:26 AM\par

## 2019-11-05 NOTE — PLAN
[FreeTextEntry1] : - Take Furosemide 20 mg x2 day for one week \par - Take one Potassium daily\par - Weigh QD\par - Call office 11/11/19 with current weight\par - Follow up with Vascular Specialist \par - Vascular referral\par

## 2019-11-11 ENCOUNTER — RX RENEWAL (OUTPATIENT)
Age: 79
End: 2019-11-11

## 2019-11-25 ENCOUNTER — RX RENEWAL (OUTPATIENT)
Age: 79
End: 2019-11-25

## 2019-12-02 ENCOUNTER — APPOINTMENT (OUTPATIENT)
Dept: FAMILY MEDICINE | Facility: CLINIC | Age: 79
End: 2019-12-02
Payer: MEDICARE

## 2019-12-02 ENCOUNTER — NON-APPOINTMENT (OUTPATIENT)
Age: 79
End: 2019-12-02

## 2019-12-02 VITALS
BODY MASS INDEX: 30.78 KG/M2 | HEIGHT: 61 IN | OXYGEN SATURATION: 98 % | WEIGHT: 163 LBS | HEART RATE: 72 BPM | DIASTOLIC BLOOD PRESSURE: 76 MMHG | SYSTOLIC BLOOD PRESSURE: 128 MMHG

## 2019-12-02 DIAGNOSIS — Z13.820 ENCOUNTER FOR SCREENING FOR OSTEOPOROSIS: ICD-10-CM

## 2019-12-02 PROCEDURE — 93000 ELECTROCARDIOGRAM COMPLETE: CPT

## 2019-12-02 PROCEDURE — 36415 COLL VENOUS BLD VENIPUNCTURE: CPT

## 2019-12-02 PROCEDURE — G0439: CPT

## 2019-12-02 RX ORDER — TRAMADOL HYDROCHLORIDE 50 MG/1
50 TABLET, COATED ORAL
Qty: 30 | Refills: 0 | Status: COMPLETED | COMMUNITY
Start: 2019-10-23 | End: 2019-12-02

## 2019-12-02 NOTE — PHYSICAL EXAM
[No Acute Distress] : no acute distress [Well Nourished] : well nourished [Well Developed] : well developed [Well-Appearing] : well-appearing [PERRL] : pupils equal round and reactive to light [Normal Sclera/Conjunctiva] : normal sclera/conjunctiva [Normal Oropharynx] : the oropharynx was normal [EOMI] : extraocular movements intact [Normal Outer Ear/Nose] : the outer ears and nose were normal in appearance [No Lymphadenopathy] : no lymphadenopathy [No JVD] : no jugular venous distention [Thyroid Normal, No Nodules] : the thyroid was normal and there were no nodules present [Supple] : supple [No Accessory Muscle Use] : no accessory muscle use [No Respiratory Distress] : no respiratory distress  [Clear to Auscultation] : lungs were clear to auscultation bilaterally [Normal Rate] : normal rate  [Regular Rhythm] : with a regular rhythm [Normal S1, S2] : normal S1 and S2 [No Carotid Bruits] : no carotid bruits [No Murmur] : no murmur heard [No Abdominal Bruit] : a ~M bruit was not heard ~T in the abdomen [No Varicosities] : no varicosities [No Edema] : there was no peripheral edema [Pedal Pulses Present] : the pedal pulses are present [No Palpable Aorta] : no palpable aorta [No Extremity Clubbing/Cyanosis] : no extremity clubbing/cyanosis [Soft] : abdomen soft [Non Tender] : non-tender [Non-distended] : non-distended [No Masses] : no abdominal mass palpated [No HSM] : no HSM [Normal Bowel Sounds] : normal bowel sounds [Normal Posterior Cervical Nodes] : no posterior cervical lymphadenopathy [Normal Anterior Cervical Nodes] : no anterior cervical lymphadenopathy [No CVA Tenderness] : no CVA  tenderness [No Spinal Tenderness] : no spinal tenderness [Grossly Normal Strength/Tone] : grossly normal strength/tone [No Joint Swelling] : no joint swelling [No Rash] : no rash [No Focal Deficits] : no focal deficits [Coordination Grossly Intact] : coordination grossly intact [Normal Gait] : normal gait [Normal Affect] : the affect was normal [Deep Tendon Reflexes (DTR)] : deep tendon reflexes were 2+ and symmetric [Normal Insight/Judgement] : insight and judgment were intact

## 2019-12-02 NOTE — END OF VISIT
[FreeTextEntry3] : Medical record entries made by the scribe today today, were at my direction and personally dictated to them by me, Dr. Olive Hughes on Dec 02, 2019. I have reviewed the chart and agree that the record accurately reflects my personal performance of the history, physical exam, assessment, and plan.\par \par

## 2019-12-02 NOTE — HEALTH RISK ASSESSMENT
[Good] : ~his/her~  mood as  good [None] : None [Alone] : lives alone [Retired] : retired [Feels Safe at Home] : Feels safe at home [] :  [Fully functional (bathing, dressing, toileting, transferring, walking, feeding)] : Fully functional (bathing, dressing, toileting, transferring, walking, feeding) [Fully functional (using the telephone, shopping, preparing meals, housekeeping, doing laundry, using] : Fully functional and needs no help or supervision to perform IADLs (using the telephone, shopping, preparing meals, housekeeping, doing laundry, using transportation, managing medications and managing finances) [No falls in past year] : Patient reported no falls in the past year [Reports normal functional visual acuity (ie: able to read med bottle)] : Reports normal functional visual acuity [With Patient/Caregiver] : With Patient/Caregiver [Designated Healthcare Proxy] : Designated healthcare proxy [Name: ___] : Health Care Proxy's Name: [unfilled]  [Relationship: ___] : Relationship: [unfilled] [Reports changes in hearing] : Reports no changes in hearing [Reports changes in vision] : Reports no changes in vision [Reports changes in dental health] : Reports no changes in dental health [AdvancecareDate] : 12/19

## 2019-12-02 NOTE — REASON FOR VISIT
[Annual Wellness Visit] : an annual wellness visit [FreeTextEntry1] : [facility] wellness exam

## 2019-12-02 NOTE — HISTORY OF PRESENT ILLNESS
[FreeTextEntry1] : annual [de-identified] : ADILSON is a 79 year female here for [facility] wellness exam. Mood is good. Pt still c/o leg pain. Pt followed up with Vascular 11/21/19, all evaluations were normal. Has attended PT in the past with no relief. Denies skin tightness/edema. Takes Oxycodone for pain PRN. Currently taking Amlodipine 5 mg, Furosemide 20 mg, Metoprolol 25 mg, Potassium 20 MEQ daily. Pt reports a change in sleep patterns. States that sometimes during the day, she will fall asleep in the chair. She reports that occasionally at night, she is unable to fall asleep more times than not. She is a clock watcher, anticipatory anxiety. Does not use alarm clock because she is unable to hear it. States that she only uses one hearing aid in the RT ear. 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

## 2019-12-02 NOTE — ADDENDUM
[FreeTextEntry1] : I, Darling Nieto acting as a scribe for Dr. Olive Hughes on Dec 02, 2019  at 11:16 AM\par

## 2019-12-02 NOTE — PLAN
[FreeTextEntry1] : - Blood work today \par - Follow up with \par - Begin taking Furosemide q other day\par - Continue to weigh in to manage water retention\par - Pneumo 23 today\par - DEXA script \par - Handicap parking pass paperwork filled out

## 2019-12-03 LAB
25(OH)D3 SERPL-MCNC: 41.4 NG/ML
ALBUMIN SERPL ELPH-MCNC: 4.2 G/DL
ALP BLD-CCNC: 105 U/L
ALT SERPL-CCNC: 13 U/L
ANION GAP SERPL CALC-SCNC: 14 MMOL/L
APPEARANCE: CLEAR
AST SERPL-CCNC: 15 U/L
BACTERIA: NEGATIVE
BASOPHILS # BLD AUTO: 0.07 K/UL
BASOPHILS NFR BLD AUTO: 1.1 %
BILIRUB SERPL-MCNC: 0.4 MG/DL
BILIRUBIN URINE: NEGATIVE
BLOOD URINE: NEGATIVE
BUN SERPL-MCNC: 25 MG/DL
CALCIUM SERPL-MCNC: 9.6 MG/DL
CHLORIDE SERPL-SCNC: 107 MMOL/L
CHOLEST SERPL-MCNC: 233 MG/DL
CHOLEST/HDLC SERPL: 4.6 RATIO
CO2 SERPL-SCNC: 20 MMOL/L
COLOR: COLORLESS
CREAT SERPL-MCNC: 1.64 MG/DL
EOSINOPHIL # BLD AUTO: 0.31 K/UL
EOSINOPHIL NFR BLD AUTO: 4.8 %
ESTIMATED AVERAGE GLUCOSE: 126 MG/DL
GLUCOSE QUALITATIVE U: NEGATIVE
GLUCOSE SERPL-MCNC: 117 MG/DL
HBA1C MFR BLD HPLC: 6 %
HCT VFR BLD CALC: 41.9 %
HDLC SERPL-MCNC: 51 MG/DL
HGB BLD-MCNC: 12.6 G/DL
HYALINE CASTS: 2 /LPF
IMM GRANULOCYTES NFR BLD AUTO: 0.5 %
KETONES URINE: NEGATIVE
LDLC SERPL CALC-MCNC: 152 MG/DL
LEUKOCYTE ESTERASE URINE: NEGATIVE
LYMPHOCYTES # BLD AUTO: 1.76 K/UL
LYMPHOCYTES NFR BLD AUTO: 27.2 %
MAN DIFF?: NORMAL
MCHC RBC-ENTMCNC: 27.6 PG
MCHC RBC-ENTMCNC: 30.1 GM/DL
MCV RBC AUTO: 91.9 FL
MICROSCOPIC-UA: NORMAL
MONOCYTES # BLD AUTO: 0.44 K/UL
MONOCYTES NFR BLD AUTO: 6.8 %
NEUTROPHILS # BLD AUTO: 3.85 K/UL
NEUTROPHILS NFR BLD AUTO: 59.6 %
NITRITE URINE: NEGATIVE
PH URINE: 6
PLATELET # BLD AUTO: 260 K/UL
POTASSIUM SERPL-SCNC: 4.6 MMOL/L
PROT SERPL-MCNC: 7.1 G/DL
PROTEIN URINE: NORMAL
RBC # BLD: 4.56 M/UL
RBC # FLD: 14.5 %
RED BLOOD CELLS URINE: 1 /HPF
SODIUM SERPL-SCNC: 141 MMOL/L
SPECIFIC GRAVITY URINE: 1.01
SQUAMOUS EPITHELIAL CELLS: 0 /HPF
TRIGL SERPL-MCNC: 149 MG/DL
TSH SERPL-ACNC: 2.84 UIU/ML
URATE SERPL-MCNC: 7 MG/DL
UROBILINOGEN URINE: NORMAL
WBC # FLD AUTO: 6.46 K/UL
WHITE BLOOD CELLS URINE: 0 /HPF

## 2019-12-23 ENCOUNTER — RX RENEWAL (OUTPATIENT)
Age: 79
End: 2019-12-23

## 2020-01-06 ENCOUNTER — APPOINTMENT (OUTPATIENT)
Dept: FAMILY MEDICINE | Facility: CLINIC | Age: 80
End: 2020-01-06
Payer: MEDICARE

## 2020-01-06 VITALS
SYSTOLIC BLOOD PRESSURE: 130 MMHG | TEMPERATURE: 97.7 F | OXYGEN SATURATION: 98 % | HEIGHT: 61 IN | DIASTOLIC BLOOD PRESSURE: 80 MMHG | WEIGHT: 160 LBS | HEART RATE: 74 BPM | BODY MASS INDEX: 30.21 KG/M2

## 2020-01-06 DIAGNOSIS — R05 COUGH: ICD-10-CM

## 2020-01-06 DIAGNOSIS — J06.9 ACUTE UPPER RESPIRATORY INFECTION, UNSPECIFIED: ICD-10-CM

## 2020-01-06 PROCEDURE — 99214 OFFICE O/P EST MOD 30 MIN: CPT

## 2020-01-06 NOTE — PHYSICAL EXAM
[No Acute Distress] : no acute distress [Well Nourished] : well nourished [Well Developed] : well developed [Well-Appearing] : well-appearing [Normal Sclera/Conjunctiva] : normal sclera/conjunctiva [EOMI] : extraocular movements intact [PERRL] : pupils equal round and reactive to light [Normal Outer Ear/Nose] : the outer ears and nose were normal in appearance [Normal Oropharynx] : the oropharynx was normal [No JVD] : no jugular venous distention [No Lymphadenopathy] : no lymphadenopathy [Supple] : supple [Thyroid Normal, No Nodules] : the thyroid was normal and there were no nodules present [No Respiratory Distress] : no respiratory distress  [No Accessory Muscle Use] : no accessory muscle use [Clear to Auscultation] : lungs were clear to auscultation bilaterally [Normal Rate] : normal rate  [Normal S1, S2] : normal S1 and S2 [Regular Rhythm] : with a regular rhythm [No Murmur] : no murmur heard [No Carotid Bruits] : no carotid bruits [No Abdominal Bruit] : a ~M bruit was not heard ~T in the abdomen [No Varicosities] : no varicosities [Pedal Pulses Present] : the pedal pulses are present [No Edema] : there was no peripheral edema [No Palpable Aorta] : no palpable aorta [No Extremity Clubbing/Cyanosis] : no extremity clubbing/cyanosis [Soft] : abdomen soft [Non Tender] : non-tender [Non-distended] : non-distended [No Masses] : no abdominal mass palpated [No HSM] : no HSM [Normal Bowel Sounds] : normal bowel sounds [Normal Posterior Cervical Nodes] : no posterior cervical lymphadenopathy [Normal Anterior Cervical Nodes] : no anterior cervical lymphadenopathy [No CVA Tenderness] : no CVA  tenderness [No Spinal Tenderness] : no spinal tenderness [No Joint Swelling] : no joint swelling [Grossly Normal Strength/Tone] : grossly normal strength/tone [No Rash] : no rash [Coordination Grossly Intact] : coordination grossly intact [No Focal Deficits] : no focal deficits [Normal Gait] : normal gait [Normal Affect] : the affect was normal [Deep Tendon Reflexes (DTR)] : deep tendon reflexes were 2+ and symmetric [Normal Insight/Judgement] : insight and judgment were intact

## 2020-01-06 NOTE — PLAN
[FreeTextEntry1] : - Likely sinusitis\par - Doxycycline 100 mg ordered \par - Flonase ordered \par - Use Flonase q HS\par - Tessalon Perls ordered

## 2020-01-06 NOTE — END OF VISIT
[FreeTextEntry3] : Medical record entries made by the scribe today today, were at my direction and personally dictated to them by me, Dr. Olive Hughes on Jan 06, 2020. I have reviewed the chart and agree that the record accurately reflects my personal performance of the history, physical exam, assessment, and plan.\par \par

## 2020-01-06 NOTE — HISTORY OF PRESENT ILLNESS
[FreeTextEntry8] : ADILSON is a 79 year female here c/o cold sxs for over 1 week. Experiencing cough/congestion. Cough is all day, productive and she is unable to sleep. Clear nasal discharge, yellow sputum in the morning. Develops HA after coughing constantly. Denies fever.

## 2020-01-06 NOTE — ADDENDUM
[FreeTextEntry1] : I, Darling Nieto acting as a scribe for Dr. Olive Hughes on Jan 06, 2020  at 2:58 PM\par

## 2020-01-06 NOTE — REVIEW OF SYSTEMS
[Nasal Discharge] : nasal discharge [Cough] : cough [Negative] : Heme/Lymph [FreeTextEntry4] : congestion

## 2020-01-16 ENCOUNTER — RX RENEWAL (OUTPATIENT)
Age: 80
End: 2020-01-16

## 2020-01-20 ENCOUNTER — RX RENEWAL (OUTPATIENT)
Age: 80
End: 2020-01-20

## 2020-02-18 ENCOUNTER — RX RENEWAL (OUTPATIENT)
Age: 80
End: 2020-02-18

## 2020-03-10 ENCOUNTER — RX RENEWAL (OUTPATIENT)
Age: 80
End: 2020-03-10

## 2020-04-23 ENCOUNTER — RX RENEWAL (OUTPATIENT)
Age: 80
End: 2020-04-23

## 2020-05-28 ENCOUNTER — RX RENEWAL (OUTPATIENT)
Age: 80
End: 2020-05-28

## 2020-06-12 ENCOUNTER — RX RENEWAL (OUTPATIENT)
Age: 80
End: 2020-06-12

## 2020-06-15 ENCOUNTER — RX RENEWAL (OUTPATIENT)
Age: 80
End: 2020-06-15

## 2020-06-29 ENCOUNTER — RX RENEWAL (OUTPATIENT)
Age: 80
End: 2020-06-29

## 2020-07-30 ENCOUNTER — RX RENEWAL (OUTPATIENT)
Age: 80
End: 2020-07-30

## 2020-08-06 ENCOUNTER — APPOINTMENT (OUTPATIENT)
Dept: FAMILY MEDICINE | Facility: CLINIC | Age: 80
End: 2020-08-06
Payer: MEDICARE

## 2020-08-06 VITALS
SYSTOLIC BLOOD PRESSURE: 128 MMHG | TEMPERATURE: 98.6 F | WEIGHT: 162 LBS | HEART RATE: 86 BPM | BODY MASS INDEX: 30.58 KG/M2 | DIASTOLIC BLOOD PRESSURE: 86 MMHG | OXYGEN SATURATION: 98 % | HEIGHT: 61 IN

## 2020-08-06 PROCEDURE — 99214 OFFICE O/P EST MOD 30 MIN: CPT

## 2020-08-06 NOTE — HISTORY OF PRESENT ILLNESS
[FreeTextEntry8] : ADISLON is a 80 year female here c/o LT calf pain. Pt states her calf is painful when standing for too long. She stated that her left leg hurts her when she stands too long. She followed up with Dr. Apple and he states that her legs are swelling with water ,and something in her leg is deviated, which is causing her difficulty walking straight. Wearing orthotics. She explained that she also has pain in her left leg when she gets out of bed in the morning. She remarked that she is taking nothing for her pain at the moment.

## 2020-08-06 NOTE — PHYSICAL EXAM
[No Acute Distress] : no acute distress [Well Nourished] : well nourished [Well Developed] : well developed [Well-Appearing] : well-appearing [Normal Sclera/Conjunctiva] : normal sclera/conjunctiva [PERRL] : pupils equal round and reactive to light [EOMI] : extraocular movements intact [Normal Outer Ear/Nose] : the outer ears and nose were normal in appearance [Normal Oropharynx] : the oropharynx was normal [No JVD] : no jugular venous distention [No Lymphadenopathy] : no lymphadenopathy [Supple] : supple [No Respiratory Distress] : no respiratory distress  [Thyroid Normal, No Nodules] : the thyroid was normal and there were no nodules present [Clear to Auscultation] : lungs were clear to auscultation bilaterally [No Accessory Muscle Use] : no accessory muscle use [Regular Rhythm] : with a regular rhythm [Normal Rate] : normal rate  [Normal S1, S2] : normal S1 and S2 [No Carotid Bruits] : no carotid bruits [No Murmur] : no murmur heard [No Varicosities] : no varicosities [No Abdominal Bruit] : a ~M bruit was not heard ~T in the abdomen [Pedal Pulses Present] : the pedal pulses are present [No Palpable Aorta] : no palpable aorta [No Extremity Clubbing/Cyanosis] : no extremity clubbing/cyanosis [Non Tender] : non-tender [Soft] : abdomen soft [No Masses] : no abdominal mass palpated [Non-distended] : non-distended [No HSM] : no HSM [Normal Bowel Sounds] : normal bowel sounds [Normal Posterior Cervical Nodes] : no posterior cervical lymphadenopathy [Normal Anterior Cervical Nodes] : no anterior cervical lymphadenopathy [No CVA Tenderness] : no CVA  tenderness [No Spinal Tenderness] : no spinal tenderness [No Joint Swelling] : no joint swelling [Grossly Normal Strength/Tone] : grossly normal strength/tone [No Rash] : no rash [Coordination Grossly Intact] : coordination grossly intact [No Focal Deficits] : no focal deficits [Normal Affect] : the affect was normal [Normal Gait] : normal gait [Normal Insight/Judgement] : insight and judgment were intact

## 2020-08-06 NOTE — END OF VISIT
[FreeTextEntry3] : Medical record entries made by the scribe today today, were at my direction and personally dictated to them by me, Dr. Olive Hughes on Aug 06, 2020. I have reviewed the chart and agree that the record accurately reflects my personal performance of the history, physical exam, assessment, and plan.\par

## 2020-08-06 NOTE — PLAN
[FreeTextEntry1] : - Meloxicam ordered, take QD for 1-2 weeks \par - She was recommended to stretch often and use a stretch roller. \par -If the pain worsens she will be prescribed a stronger pain med. \par -She was also advised to ice her right leg and follow up for a CPE in December.

## 2020-08-06 NOTE — ADDENDUM
[FreeTextEntry1] : I, Darling Nieto acting as a scribe for Dr. Olive uHghes on Aug 06, 2020  at 5:31 PM

## 2020-08-06 NOTE — REVIEW OF SYSTEMS
[Lower Ext Edema] : lower extremity edema [Joint Pain] : joint pain [Negative] : Heme/Lymph [FreeTextEntry9] : left leg/ankle.

## 2020-08-10 ENCOUNTER — RX RENEWAL (OUTPATIENT)
Age: 80
End: 2020-08-10

## 2020-08-23 ENCOUNTER — RX RENEWAL (OUTPATIENT)
Age: 80
End: 2020-08-23

## 2020-09-09 ENCOUNTER — RX RENEWAL (OUTPATIENT)
Age: 80
End: 2020-09-09

## 2020-09-10 ENCOUNTER — RX RENEWAL (OUTPATIENT)
Age: 80
End: 2020-09-10

## 2020-09-21 ENCOUNTER — APPOINTMENT (OUTPATIENT)
Dept: FAMILY MEDICINE | Facility: CLINIC | Age: 80
End: 2020-09-21

## 2020-09-23 NOTE — PHYSICAL EXAM
PED, no fluid, no evidence of reactivation. No tx needed, monitor. [No Acute Distress] : no acute distress [Well Developed] : well developed [Well-Appearing] : well-appearing [Well Nourished] : well nourished [Normal Sclera/Conjunctiva] : normal sclera/conjunctiva [PERRL] : pupils equal round and reactive to light [EOMI] : extraocular movements intact [Normal Outer Ear/Nose] : the outer ears and nose were normal in appearance [Normal Oropharynx] : the oropharynx was normal [No JVD] : no jugular venous distention [No Lymphadenopathy] : no lymphadenopathy [Supple] : supple [No Accessory Muscle Use] : no accessory muscle use [Thyroid Normal, No Nodules] : the thyroid was normal and there were no nodules present [No Respiratory Distress] : no respiratory distress  [Clear to Auscultation] : lungs were clear to auscultation bilaterally [Normal Rate] : normal rate  [Regular Rhythm] : with a regular rhythm [Normal S1, S2] : normal S1 and S2 [No Murmur] : no murmur heard [No Carotid Bruits] : no carotid bruits [No Abdominal Bruit] : a ~M bruit was not heard ~T in the abdomen [Pedal Pulses Present] : the pedal pulses are present [No Varicosities] : no varicosities [No Edema] : there was no peripheral edema [No Palpable Aorta] : no palpable aorta [No Extremity Clubbing/Cyanosis] : no extremity clubbing/cyanosis [Soft] : abdomen soft [Non-distended] : non-distended [Non Tender] : non-tender [No Masses] : no abdominal mass palpated [No HSM] : no HSM [Normal Posterior Cervical Nodes] : no posterior cervical lymphadenopathy [Normal Bowel Sounds] : normal bowel sounds [Normal Anterior Cervical Nodes] : no anterior cervical lymphadenopathy [No CVA Tenderness] : no CVA  tenderness [No Spinal Tenderness] : no spinal tenderness [No Joint Swelling] : no joint swelling [Grossly Normal Strength/Tone] : grossly normal strength/tone [Coordination Grossly Intact] : coordination grossly intact [No Rash] : no rash [No Focal Deficits] : no focal deficits [Normal Gait] : normal gait [Deep Tendon Reflexes (DTR)] : deep tendon reflexes were 2+ and symmetric [Normal Affect] : the affect was normal [Normal Insight/Judgement] : insight and judgment were intact

## 2020-10-18 ENCOUNTER — RX RENEWAL (OUTPATIENT)
Age: 80
End: 2020-10-18

## 2020-11-25 ENCOUNTER — RX RENEWAL (OUTPATIENT)
Age: 80
End: 2020-11-25

## 2020-11-30 ENCOUNTER — RX RENEWAL (OUTPATIENT)
Age: 80
End: 2020-11-30

## 2020-12-08 ENCOUNTER — APPOINTMENT (OUTPATIENT)
Dept: FAMILY MEDICINE | Facility: CLINIC | Age: 80
End: 2020-12-08
Payer: MEDICARE

## 2020-12-08 ENCOUNTER — NON-APPOINTMENT (OUTPATIENT)
Age: 80
End: 2020-12-08

## 2020-12-08 VITALS — SYSTOLIC BLOOD PRESSURE: 128 MMHG | DIASTOLIC BLOOD PRESSURE: 82 MMHG

## 2020-12-08 VITALS
WEIGHT: 163 LBS | OXYGEN SATURATION: 98 % | SYSTOLIC BLOOD PRESSURE: 124 MMHG | HEIGHT: 61 IN | HEART RATE: 85 BPM | BODY MASS INDEX: 30.78 KG/M2 | DIASTOLIC BLOOD PRESSURE: 86 MMHG

## 2020-12-08 DIAGNOSIS — E55.9 VITAMIN D DEFICIENCY, UNSPECIFIED: ICD-10-CM

## 2020-12-08 PROCEDURE — G0442 ANNUAL ALCOHOL SCREEN 15 MIN: CPT | Mod: 59

## 2020-12-08 PROCEDURE — G0439: CPT

## 2020-12-08 PROCEDURE — 93000 ELECTROCARDIOGRAM COMPLETE: CPT | Mod: 59

## 2020-12-08 PROCEDURE — 36415 COLL VENOUS BLD VENIPUNCTURE: CPT

## 2020-12-08 RX ORDER — OXYCODONE AND ACETAMINOPHEN 5; 325 MG/1; MG/1
5-325 TABLET ORAL
Qty: 28 | Refills: 0 | Status: COMPLETED | COMMUNITY
Start: 2019-10-25 | End: 2020-12-08

## 2020-12-08 RX ORDER — BENZONATATE 100 MG/1
100 CAPSULE ORAL EVERY 8 HOURS
Qty: 30 | Refills: 1 | Status: COMPLETED | COMMUNITY
Start: 2020-01-06 | End: 2020-12-08

## 2020-12-08 RX ORDER — DOXYCYCLINE HYCLATE 100 MG/1
100 CAPSULE ORAL
Qty: 14 | Refills: 0 | Status: COMPLETED | COMMUNITY
Start: 2020-01-06 | End: 2020-12-08

## 2020-12-08 NOTE — HISTORY OF PRESENT ILLNESS
[FreeTextEntry1] : annual wellness exam [de-identified] : ADILSON is a 80 year female here for annual wellness exam. Mood is good. Currently taking Amlodipine 5 mg, Fish Oil, Flax seed oil, Furosemide 20 mg, Metoprolol Succinate 25 mg, Potassium Chloride 20 mg daily. Taking Vit D weekly. Pt leads an active lifestyle and eats a well balanced diet. Reports no falls in the past year but reports fast movements cause her to lose her balance. Reports that her urinary habits are normal. Her bowel movements are returning to normal as prior she was eating Activia yogurt but it was causing loose stools. \par Pt presents today c/o occasional RUQ pain. Tends to last a few seconds. Reports she takes TUMS as needed. Not related to eating. She had shingles in the past in the area of discomfort.\par Follows up with Ophthalmologist regularly for glaucoma management.

## 2020-12-08 NOTE — ADDENDUM
[FreeTextEntry1] : I, Darling Nieto acting as a scribe for Dr. Olive Hughes on Dec 08, 2020  at 9:50 AM\par

## 2020-12-08 NOTE — END OF VISIT
[FreeTextEntry3] : Medical record entries made by the scribe today today, were at my direction and personally dictated to them by me, Dr. Olive Hughes on Dec 08, 2020. I have reviewed the chart and agree that the record accurately reflects my personal performance of the history, physical exam, assessment, and plan.\par \par

## 2020-12-08 NOTE — PLAN
[FreeTextEntry1] : - Blood work today \par - Medication refills ordered\par - Will refer to balance PT after COVID \par - RUQ pain likely post herpetic neuralgia \par - Declined DEXA \par - Declined Cardiology referral\par - Follow up in 6 months \par

## 2020-12-08 NOTE — HEALTH RISK ASSESSMENT
[Good] : ~his/her~  mood as  good [No falls in past year] : Patient reported no falls in the past year [None] : None [Retired] : retired [] :  [Feels Safe at Home] : Feels safe at home [Fully functional (bathing, dressing, toileting, transferring, walking, feeding)] : Fully functional (bathing, dressing, toileting, transferring, walking, feeding) [Fully functional (using the telephone, shopping, preparing meals, housekeeping, doing laundry, using] : Fully functional and needs no help or supervision to perform IADLs (using the telephone, shopping, preparing meals, housekeeping, doing laundry, using transportation, managing medications and managing finances) [With Family] : lives with family [BoneDensityComments] : pt.refused [ColonoscopyComments] : pt. refused

## 2020-12-11 LAB
25(OH)D3 SERPL-MCNC: 43.2 NG/ML
ALBUMIN SERPL ELPH-MCNC: 3.8 G/DL
ALP BLD-CCNC: 114 U/L
ALT SERPL-CCNC: 12 U/L
ANION GAP SERPL CALC-SCNC: 10 MMOL/L
APPEARANCE: CLEAR
AST SERPL-CCNC: 17 U/L
BACTERIA: NEGATIVE
BASOPHILS # BLD AUTO: 0.09 K/UL
BASOPHILS NFR BLD AUTO: 1.5 %
BILIRUB SERPL-MCNC: 0.3 MG/DL
BILIRUBIN URINE: NEGATIVE
BLOOD URINE: NEGATIVE
BUN SERPL-MCNC: 26 MG/DL
CALCIUM SERPL-MCNC: 9.2 MG/DL
CHLORIDE SERPL-SCNC: 109 MMOL/L
CHOLEST SERPL-MCNC: 241 MG/DL
CO2 SERPL-SCNC: 22 MMOL/L
COLOR: NORMAL
CREAT SERPL-MCNC: 1.5 MG/DL
EOSINOPHIL # BLD AUTO: 0.54 K/UL
EOSINOPHIL NFR BLD AUTO: 8.8 %
ESTIMATED AVERAGE GLUCOSE: 126 MG/DL
GLUCOSE QUALITATIVE U: NEGATIVE
GLUCOSE SERPL-MCNC: 117 MG/DL
HBA1C MFR BLD HPLC: 6 %
HCT VFR BLD CALC: 40.4 %
HDLC SERPL-MCNC: 53 MG/DL
HGB BLD-MCNC: 11.5 G/DL
HYALINE CASTS: 0 /LPF
IMM GRANULOCYTES NFR BLD AUTO: 0.3 %
KETONES URINE: NEGATIVE
LDLC SERPL CALC-MCNC: 168 MG/DL
LEUKOCYTE ESTERASE URINE: NEGATIVE
LYMPHOCYTES # BLD AUTO: 1.78 K/UL
LYMPHOCYTES NFR BLD AUTO: 29.1 %
MAN DIFF?: NORMAL
MCHC RBC-ENTMCNC: 27.1 PG
MCHC RBC-ENTMCNC: 28.5 GM/DL
MCV RBC AUTO: 95.3 FL
MICROSCOPIC-UA: NORMAL
MONOCYTES # BLD AUTO: 0.5 K/UL
MONOCYTES NFR BLD AUTO: 8.2 %
NEUTROPHILS # BLD AUTO: 3.18 K/UL
NEUTROPHILS NFR BLD AUTO: 52.1 %
NITRITE URINE: NEGATIVE
NONHDLC SERPL-MCNC: 188 MG/DL
PH URINE: 6
PLATELET # BLD AUTO: 250 K/UL
POTASSIUM SERPL-SCNC: 4.7 MMOL/L
PROT SERPL-MCNC: 6.6 G/DL
PROTEIN URINE: ABNORMAL
RBC # BLD: 4.24 M/UL
RBC # FLD: 14.9 %
RED BLOOD CELLS URINE: 1 /HPF
SARS-COV-2 IGG SERPL IA-ACNC: 0.08 INDEX
SARS-COV-2 IGG SERPL QL IA: NEGATIVE
SODIUM SERPL-SCNC: 142 MMOL/L
SPECIFIC GRAVITY URINE: 1.02
SQUAMOUS EPITHELIAL CELLS: 1 /HPF
TRIGL SERPL-MCNC: 100 MG/DL
TSH SERPL-ACNC: 2.69 UIU/ML
URATE SERPL-MCNC: 7.2 MG/DL
UROBILINOGEN URINE: NORMAL
WBC # FLD AUTO: 6.11 K/UL
WHITE BLOOD CELLS URINE: 5 /HPF

## 2020-12-23 PROBLEM — J06.9 URI, ACUTE: Status: RESOLVED | Noted: 2020-01-06 | Resolved: 2020-12-23

## 2021-01-04 ENCOUNTER — RX RENEWAL (OUTPATIENT)
Age: 81
End: 2021-01-04

## 2021-01-14 ENCOUNTER — TRANSCRIPTION ENCOUNTER (OUTPATIENT)
Age: 81
End: 2021-01-14

## 2021-01-15 ENCOUNTER — APPOINTMENT (OUTPATIENT)
Dept: FAMILY MEDICINE | Facility: CLINIC | Age: 81
End: 2021-01-15
Payer: MEDICARE

## 2021-01-15 VITALS
TEMPERATURE: 98 F | HEART RATE: 80 BPM | DIASTOLIC BLOOD PRESSURE: 74 MMHG | HEIGHT: 61 IN | SYSTOLIC BLOOD PRESSURE: 126 MMHG | WEIGHT: 167 LBS | BODY MASS INDEX: 31.53 KG/M2 | OXYGEN SATURATION: 97 %

## 2021-01-15 DIAGNOSIS — H92.01 OTALGIA, RIGHT EAR: ICD-10-CM

## 2021-01-15 DIAGNOSIS — L03.90 CELLULITIS, UNSPECIFIED: ICD-10-CM

## 2021-01-15 PROCEDURE — 99213 OFFICE O/P EST LOW 20 MIN: CPT

## 2021-01-15 NOTE — HISTORY OF PRESENT ILLNESS
[FreeTextEntry8] : 79 yo female presents with complaint of right ear pain/redness/swelling. Pain is 2-3/10 in severity, ear is itchy. In addition, it has been red and swollen. she went to urgent care on Wednesday, she was diagnosed with cellulitis of the right ear and given Bactrim abx. She has been compliant with medication, ear is less hot, but still considerably swollen, and area of redness is worsening towards right cheek. Denies fever, chills, cp, palpitations, sob, nv, heat/cold intolerance, dizziness, recent swimming or calf pain.

## 2021-01-15 NOTE — PHYSICAL EXAM
[Normal] : normal gait, coordination grossly intact, no focal deficits and deep tendon reflexes were 2+ and symmetric [de-identified] : right ear edema, erythema, nontender, no exudates/bleeding/scaling

## 2021-01-15 NOTE — ASSESSMENT
[FreeTextEntry1] : Right ear pain/swelling: initially diagnosed as cellulitis, no warmth/nontender at this time, c/w bactrim, will refer to ENT for appointment today for evaluation\par RTC 1 wk

## 2021-01-20 ENCOUNTER — APPOINTMENT (OUTPATIENT)
Dept: ORTHOPEDIC SURGERY | Facility: CLINIC | Age: 81
End: 2021-01-20
Payer: MEDICARE

## 2021-01-20 VITALS
SYSTOLIC BLOOD PRESSURE: 121 MMHG | WEIGHT: 167 LBS | HEIGHT: 61 IN | DIASTOLIC BLOOD PRESSURE: 73 MMHG | BODY MASS INDEX: 31.53 KG/M2 | HEART RATE: 70 BPM

## 2021-01-20 DIAGNOSIS — R60.0 LOCALIZED EDEMA: ICD-10-CM

## 2021-01-20 PROCEDURE — 99214 OFFICE O/P EST MOD 30 MIN: CPT

## 2021-01-20 RX ORDER — TIMOLOL MALEATE 5 MG/ML
0.5 SOLUTION OPHTHALMIC
Qty: 5 | Refills: 0 | Status: ACTIVE | COMMUNITY
Start: 2020-08-12

## 2021-01-20 RX ORDER — NETARSUDIL 0.2 MG/ML
0.02 SOLUTION/ DROPS OPHTHALMIC; TOPICAL
Qty: 2 | Refills: 0 | Status: ACTIVE | COMMUNITY
Start: 2020-09-01

## 2021-01-22 NOTE — HISTORY OF PRESENT ILLNESS
[FreeTextEntry1] : 80 year old female presenting with left calf pain. The patient is accompanied by her daughter. The patient’s pain is noted to be a 9-10/10. The patient's pain began in 2019, but had an increase in pain beginning 1 week ago. The patient has been inactive for a few days. The patient cannot attribute their pain to any specific injury, fall, or trauma. The patient describes their pain as intermittent. Patient states she had physical therapy but did not have improvement. The patient reports difficulty with walking. She has been utilizing orthotics which are at least 1 year old. The patient presents ambulating with a cane.  She is currently taking pain medication with no improvement. She is on high blood pressure medication as well. No other complaints at this time.

## 2021-01-22 NOTE — DISCUSSION/SUMMARY
[de-identified] : Today I had a lengthy discussion with the patient regarding their left calf pain. I have addressed all the patient's concerns surrounding the pathology of their condition. I recommend that the patient receive a venous Doppler DVT test bilaterally. The DVT test was ordered for the patient in the office today. I advised the patient to update her orthotics. I recommend the patient utilize ice, heat, and do range of motion exercises. I recommend that the patient utilize Voltaren gel topically. A discussion was had about physical therapy, but the patient deferred at this time. I would like to see the patient back in the office PRN to reassess their condition, and will consider obtaining a new MRI if there is no improvement. The patient understood and verbally agreed to the treatment plan. All of their questions were answered and they were satisfied with the visit. The patient should call the office if they have any questions or experience worsening symptoms.

## 2021-01-22 NOTE — PHYSICAL EXAM
[de-identified] : General: Alert and oriented x3. In no acute distress. Pleasant in nature with a normal affect. No apparent respiratory distress.\par \par L Ankle Exam\par Skin: Clean, dry, intact\par Inspection: No obvious malalignment, no swelling, no effusion; no lymphadenopathy\par Pulses: 2+ DP/PT pulses\par ROM: L Ankle 10 degrees of dorsiflexion, 40 degrees of plantarflexion, 10 degrees of subtalar motion\par Tenderness: +posterior calf pain. +anterior compartment. No tenderness over the lateral malleolus, no CFL/ATFL/PTFL pain. No medial malleolus pain, no deltoid ligament pain. No proximal fibular pain. No heel pain.\par Stability: Negative anterior/posterior drawer.\par Strength: 5/5 TA/GS/EHL\par Neuro: In tact to light touch throughout\par Additional tests: Negative Mortons test, Negative syndesmosis squeeze test.  [de-identified] : An MRI was obtained of the left lower extremity from [facility] on 7/13/2019 and reviewed by me today 01/20/2021  in the office. Revealed: No evidence of muscle strain or tear of the proximal calf muscles. \par Subcutaneous edema adjacent to the proximal peroneal and lateral gastrocnemius muscles. \par Mild faint edema within the distal peroneus musculature.

## 2021-01-22 NOTE — CONSULT LETTER
[Consult Letter:] : I had the pleasure of evaluating your patient, [unfilled]. [Please see my note below.] : Please see my note below. [Consult Closing:] : Thank you very much for allowing me to participate in the care of this patient.  If you have any questions, please do not hesitate to contact me. [Sincerely,] : Sincerely, [FreeTextEntry3] : London Zhang, DO\par Foot and Ankle Surgery\par

## 2021-01-22 NOTE — ADDENDUM
[FreeTextEntry1] : I, Chris Salamanca, acted solely as a scribe for Dr. London Zhang on this date 01/20/2021 .\par All medical record entries made by the Scribe were at my, Dr. London Zhang, direction and personally dictated by me on 01/20/2021 . I have reviewed the chart and agree that the record accurately reflects my personal performance of the history, physical exam, assessment and plan. I have also personally directed, reviewed, and agreed with the chart.

## 2021-01-25 ENCOUNTER — OUTPATIENT (OUTPATIENT)
Dept: OUTPATIENT SERVICES | Facility: HOSPITAL | Age: 81
LOS: 1 days | End: 2021-01-25
Payer: MEDICARE

## 2021-01-25 ENCOUNTER — RESULT REVIEW (OUTPATIENT)
Age: 81
End: 2021-01-25

## 2021-01-25 ENCOUNTER — APPOINTMENT (OUTPATIENT)
Dept: ULTRASOUND IMAGING | Facility: CLINIC | Age: 81
End: 2021-01-25
Payer: MEDICARE

## 2021-01-25 DIAGNOSIS — M79.661 PAIN IN RIGHT LOWER LEG: ICD-10-CM

## 2021-01-25 PROCEDURE — 93970 EXTREMITY STUDY: CPT | Mod: 26

## 2021-01-25 PROCEDURE — 93970 EXTREMITY STUDY: CPT

## 2021-01-26 ENCOUNTER — RX RENEWAL (OUTPATIENT)
Age: 81
End: 2021-01-26

## 2021-01-31 ENCOUNTER — RX RENEWAL (OUTPATIENT)
Age: 81
End: 2021-01-31

## 2021-02-04 ENCOUNTER — APPOINTMENT (OUTPATIENT)
Dept: ORTHOPEDIC SURGERY | Facility: CLINIC | Age: 81
End: 2021-02-04
Payer: MEDICARE

## 2021-02-04 DIAGNOSIS — M76.812 ANTERIOR TIBIAL SYNDROME, LEFT LEG: ICD-10-CM

## 2021-02-04 PROCEDURE — 99213 OFFICE O/P EST LOW 20 MIN: CPT

## 2021-02-08 ENCOUNTER — OUTPATIENT (OUTPATIENT)
Dept: OUTPATIENT SERVICES | Facility: HOSPITAL | Age: 81
LOS: 1 days | End: 2021-02-08
Payer: MEDICARE

## 2021-02-08 ENCOUNTER — RESULT REVIEW (OUTPATIENT)
Age: 81
End: 2021-02-08

## 2021-02-08 ENCOUNTER — APPOINTMENT (OUTPATIENT)
Dept: MRI IMAGING | Facility: CLINIC | Age: 81
End: 2021-02-08
Payer: MEDICARE

## 2021-02-08 DIAGNOSIS — M79.661 PAIN IN RIGHT LOWER LEG: ICD-10-CM

## 2021-02-08 DIAGNOSIS — M76.812 ANTERIOR TIBIAL SYNDROME, LEFT LEG: ICD-10-CM

## 2021-02-08 PROCEDURE — G1004: CPT

## 2021-02-08 PROCEDURE — 73718 MRI LOWER EXTREMITY W/O DYE: CPT | Mod: 26,LT,ME

## 2021-02-08 PROCEDURE — 73718 MRI LOWER EXTREMITY W/O DYE: CPT

## 2021-02-12 ENCOUNTER — APPOINTMENT (OUTPATIENT)
Dept: ORTHOPEDIC SURGERY | Facility: CLINIC | Age: 81
End: 2021-02-12
Payer: MEDICARE

## 2021-02-12 DIAGNOSIS — M25.551 PAIN IN RIGHT HIP: ICD-10-CM

## 2021-02-12 DIAGNOSIS — M25.552 PAIN IN RIGHT HIP: ICD-10-CM

## 2021-02-12 PROCEDURE — 99214 OFFICE O/P EST MOD 30 MIN: CPT

## 2021-02-12 NOTE — HISTORY OF PRESENT ILLNESS
[FreeTextEntry1] : 81 year old female presenting with left calf pain. She presents today to review her MRI results. The patient’s pain is noted to be a 4/10. The pain is noted to be the same and the swelling is noted to be 100% improved compared to the previous visit. The patient presents ambulating with a cane. She noticed recently she is starting to limp and feels this is causing pain in her lateral hips and back. The patient notes that on Christmas Eve she was knocked off of a chair and fell on her buttocks. Patient had a Doppler ultrasound which was negative for DVT. She is currently taking no pain medication. Patient is many years s/p left total knee replacement at [facility]. No other complaints at this time.

## 2021-02-12 NOTE — ADDENDUM
[FreeTextEntry1] : I, Shalom Tapia, acted solely as a scribe for Dr. London Zhang on this date 02/12/2021. \par All medical record entries made by the Scribe were at my, Dr. London Zhang, direction and personally dictated by me on 02/12/2021 . I have reviewed the chart and agree that the record accurately reflects my personal performance of the history, physical exam, assessment and plan. I have also personally directed, reviewed, and agreed with the chart.

## 2021-02-12 NOTE — DISCUSSION/SUMMARY
[de-identified] : Today I had a lengthy discussion with the patient regarding her left calf pain. I have addressed all the patient's concerns surrounding the pathology of her condition. The patient's MRI was reviewed with her in the office today. I recommend that the patient utilize ice, NSAIDs PRN, and heat. She can also elevate her left leg above the level of the heart. I recommend the patient undergo a course of physical therapy for the left calf and hips 2-3 times a week for a total of 6-8 weeks. A prescription was given for the physical therapy today. The patient is requesting home physical therapy. I would like to see the patient back in the office in 6-8 weeks to reassess her condition. The patient understood and verbally agreed to the treatment plan. All of her questions were answered and she was satisfied with the visit. The patient should call the office if she has any questions or experience worsening symptoms.

## 2021-02-12 NOTE — PHYSICAL EXAM
[de-identified] : General: Alert and oriented x3. In no acute distress. Pleasant in nature with a normal affect. No apparent respiratory distress.\par \par L Ankle Exam\par Skin: Clean, dry, intact\par Inspection: No obvious malalignment, no swelling, no effusion; no lymphadenopathy\par Pulses: 2+ DP/PT pulses\par ROM: L Ankle 10 degrees of active dorsiflexion, 40 degrees of plantarflexion, 10 degrees of subtalar motion. Tibialis anterior intact\par Tenderness: +posterior calf pain. +anterior compartment. No tenderness over the lateral malleolus, no CFL/ATFL/PTFL pain. No medial malleolus pain, no deltoid ligament pain. No proximal fibular pain. No heel pain.\par Stability: Negative anterior/posterior drawer.\par Strength: 5/5 TA/GS/EHL\par Neuro: In tact to light touch throughout\par Additional tests: Negative Mortons test, Negative syndesmosis squeeze test.  [de-identified] : MRI of the left tib-fib obtained from an outside facility on 2/8/2021 and reviewed in the office today, 02/12/2021, revealed: \par 1. Nonspecific feathery edema involving the anterior extensor muscles from the level of the proximal tibias/fibula to the ankle. Muscle edema is also seen to a lesser extent within the distal portion of the medial flexor musculature which is new since the prior examination and within the peroneal musculature which is increased compared to the prior examination. No associated fatty muscle infiltration is demonstrated. Differential considerations include muscle strain, inflammatory etiologies, or neurogenic etiologies.\par 2. Prominent subcutaneous edema is seen about the mid to distal portion of the lower leg, most prominent anteromedially. This is similar compared to the prior examination.\par 3. Nonspecific left knee joint effusion. Status post left knee arthroplasty.

## 2021-02-15 ENCOUNTER — RX RENEWAL (OUTPATIENT)
Age: 81
End: 2021-02-15

## 2021-02-22 ENCOUNTER — RX RENEWAL (OUTPATIENT)
Age: 81
End: 2021-02-22

## 2021-02-25 NOTE — PHYSICAL EXAM
[de-identified] : General: Alert and oriented x3. In no acute distress. Pleasant in nature with a normal affect. No apparent respiratory distress.\par \par L Ankle Exam\par Skin: Clean, dry, intact\par Inspection: No obvious malalignment, no swelling, no effusion; no lymphadenopathy\par Pulses: 2+ DP/PT pulses\par ROM: L Ankle 10 degrees of active dorsiflexion, 40 degrees of plantarflexion, 10 degrees of subtalar motion. Tibialis anterior intact\par Tenderness: +posterior calf pain. +anterior compartment. No tenderness over the lateral malleolus, no CFL/ATFL/PTFL pain. No medial malleolus pain, no deltoid ligament pain. No proximal fibular pain. No heel pain.\par Stability: Negative anterior/posterior drawer.\par Strength: 5/5 TA/GS/EHL\par Neuro: In tact to light touch throughout\par Additional tests: Negative Mortons test, Negative syndesmosis squeeze test.  [de-identified] : None new obtained.

## 2021-02-25 NOTE — DISCUSSION/SUMMARY
[de-identified] : Today I had a lengthy discussion with the patient regarding their left calf pain. I have addressed all the patient's concerns surrounding the pathology of their condition. I recommend that the patient utilize NSAIDs, ice, and heat PRN. At this time I would like to obtain advanced imaging of the patient's left leg. An MRI was ordered so I can find out more about the etiology of the patient's condition. The patient should follow up with the office after obtaining the MRI. The patient understood and verbally agreed to the treatment plan. All of their questions were answered and they were satisfied with the visit. The patient should call the office if they have any questions or experience worsening symptoms.

## 2021-02-25 NOTE — ADDENDUM
[FreeTextEntry1] : I, Chris Salamanca, acted solely as a scribe for Dr. London Zhang on this date 02/04/2021 .\par All medical record entries made by the Scribe were at my, Dr. London Zhang, direction and personally dictated by me on 02/04/2021 . I have reviewed the chart and agree that the record accurately reflects my personal performance of the history, physical exam, assessment and plan. I have also personally directed, reviewed, and agreed with the chart.

## 2021-02-25 NOTE — HISTORY OF PRESENT ILLNESS
[FreeTextEntry1] : 81 year old female presenting with left calf pain. The patient’s pain is noted to be a 8/10. The pain and swelling are noted to be the same compared to the previous visit. The patient presents ambulating with a cane, and states she is limping and cannot walk straight. Patient had a Doppler ultrasound which was negative for DVT. She is currently taking no pain medication. No other complaints at this time.

## 2021-03-07 ENCOUNTER — RX RENEWAL (OUTPATIENT)
Age: 81
End: 2021-03-07

## 2021-03-10 ENCOUNTER — RX RENEWAL (OUTPATIENT)
Age: 81
End: 2021-03-10

## 2021-03-25 ENCOUNTER — APPOINTMENT (OUTPATIENT)
Dept: ORTHOPEDIC SURGERY | Facility: CLINIC | Age: 81
End: 2021-03-25
Payer: MEDICARE

## 2021-03-25 DIAGNOSIS — M79.662 PAIN IN RIGHT LOWER LEG: ICD-10-CM

## 2021-03-25 DIAGNOSIS — M17.10 UNILATERAL PRIMARY OSTEOARTHRITIS, UNSPECIFIED KNEE: ICD-10-CM

## 2021-03-25 DIAGNOSIS — M79.661 PAIN IN RIGHT LOWER LEG: ICD-10-CM

## 2021-03-25 PROCEDURE — 99213 OFFICE O/P EST LOW 20 MIN: CPT

## 2021-03-25 NOTE — ADDENDUM
[FreeTextEntry1] : I, Chris Salamanca, acted solely as a scribe for Dr. London Zhang on this date 03/25/2021 .\par All medical record entries made by the Scribe were at my, Dr. London Zhang, direction and personally dictated by me on 03/25/2021 . I have reviewed the chart and agree that the record accurately reflects my personal performance of the history, physical exam, assessment and plan. I have also personally directed, reviewed, and agreed with the chart.

## 2021-03-25 NOTE — DISCUSSION/SUMMARY
[de-identified] : Today I had a lengthy discussion with the patient regarding their left leg, lower back pain. I have addressed all the patient's concerns surrounding the pathology of their condition. I advised the patient to continue with the exercises on her own. Regarding the patient's back pain, I recommend the patient see a physiatrist and neurologist for further evaluation. A referral was provided today. I would like to see the patient back in the office PRN to reassess their condition. The patient understood and verbally agreed to the treatment plan. All of their questions were answered and they were satisfied with the visit. The patient should call the office if they have any questions or experience worsening symptoms.

## 2021-03-25 NOTE — PHYSICAL EXAM
[de-identified] : General: Alert and oriented x3. In no acute distress. Pleasant in nature with a normal affect. No apparent respiratory distress.\par \par L Ankle Exam\par Skin: Clean, dry, intact\par Inspection: No obvious malalignment, no swelling, no effusion; no lymphadenopathy\par Pulses: 2+ DP/PT pulses\par ROM: L Ankle -5 degrees of active dorsiflexion, 40 degrees of plantarflexion, 10 degrees of subtalar motion. Tibialis anterior intact\par Tenderness: Improved posterior calf pain. Improved anterior compartment. No tenderness over the lateral malleolus, no CFL/ATFL/PTFL pain. No medial malleolus pain, no deltoid ligament pain. No proximal fibular pain. No heel pain.\par Stability: Negative anterior/posterior drawer.\par Strength: 5/5 TA/GS/EHL\par Neuro: Lateral leg sensory changes.\par Additional tests: Negative Mortons test, Negative syndesmosis squeeze test.  [de-identified] : None new obtained.

## 2021-03-25 NOTE — HISTORY OF PRESENT ILLNESS
[de-identified] : 81 year old female presenting with left leg, lower back pain. The patient’s pain is noted to be a 4/10. She states her leg pain has improved, but is now having pain in her lower back. No new injury or trauma. She states she is unable to stand up straight due to pain. She also c/o a nerve abnormality in her leg when shaving her leg, and also c/o difficulty with moving her toes. The patient has been attending physical therapy. The patient presents ambulating with a cane.   She is currently taking no pain medication. No other complaints at this time.

## 2021-04-28 ENCOUNTER — APPOINTMENT (OUTPATIENT)
Dept: PHYSICAL MEDICINE AND REHAB | Facility: CLINIC | Age: 81
End: 2021-04-28
Payer: MEDICARE

## 2021-04-28 ENCOUNTER — OUTPATIENT (OUTPATIENT)
Dept: OUTPATIENT SERVICES | Facility: HOSPITAL | Age: 81
LOS: 1 days | End: 2021-04-28
Payer: MEDICARE

## 2021-04-28 ENCOUNTER — APPOINTMENT (OUTPATIENT)
Dept: MRI IMAGING | Facility: CLINIC | Age: 81
End: 2021-04-28
Payer: MEDICARE

## 2021-04-28 VITALS
OXYGEN SATURATION: 98 % | HEART RATE: 65 BPM | TEMPERATURE: 98 F | HEIGHT: 61 IN | RESPIRATION RATE: 14 BRPM | SYSTOLIC BLOOD PRESSURE: 195 MMHG | WEIGHT: 165 LBS | DIASTOLIC BLOOD PRESSURE: 104 MMHG | BODY MASS INDEX: 31.15 KG/M2

## 2021-04-28 DIAGNOSIS — Z78.9 OTHER SPECIFIED HEALTH STATUS: ICD-10-CM

## 2021-04-28 DIAGNOSIS — M54.16 RADICULOPATHY, LUMBAR REGION: ICD-10-CM

## 2021-04-28 DIAGNOSIS — Z80.9 FAMILY HISTORY OF MALIGNANT NEOPLASM, UNSPECIFIED: ICD-10-CM

## 2021-04-28 DIAGNOSIS — T14.8XXA OTHER INJURY OF UNSPECIFIED BODY REGION, INITIAL ENCOUNTER: ICD-10-CM

## 2021-04-28 DIAGNOSIS — Z86.79 PERSONAL HISTORY OF OTHER DISEASES OF THE CIRCULATORY SYSTEM: ICD-10-CM

## 2021-04-28 PROCEDURE — G1004: CPT

## 2021-04-28 PROCEDURE — 99204 OFFICE O/P NEW MOD 45 MIN: CPT

## 2021-04-28 PROCEDURE — 72148 MRI LUMBAR SPINE W/O DYE: CPT

## 2021-04-28 PROCEDURE — 72148 MRI LUMBAR SPINE W/O DYE: CPT | Mod: 26,ME

## 2021-04-28 RX ORDER — MELOXICAM 7.5 MG/1
7.5 TABLET ORAL
Qty: 28 | Refills: 0 | Status: DISCONTINUED | COMMUNITY
Start: 2019-06-24 | End: 2021-04-28

## 2021-04-28 RX ORDER — FLUTICASONE PROPIONATE 50 UG/1
50 SPRAY, METERED NASAL DAILY
Qty: 1 | Refills: 4 | Status: DISCONTINUED | COMMUNITY
Start: 2020-01-06 | End: 2021-04-28

## 2021-04-28 RX ORDER — SULFAMETHOXAZOLE AND TRIMETHOPRIM 800; 160 MG/1; MG/1
800-160 TABLET ORAL
Qty: 14 | Refills: 0 | Status: DISCONTINUED | COMMUNITY
Start: 2021-01-13 | End: 2021-04-28

## 2021-04-28 NOTE — ASSESSMENT
[FreeTextEntry1] : Ms. ADILSON GARNER is a 81 year old female who presents with persistent LLE pain possibly related to a lumbar radiculopathy, specifically L5, vs a peripheral nerve injury. Denies any red flag signs. Will recommend:\par - MRI of L Spine to evaluate for nerve impingement, especially left L5\par - EMG/NCS of LLE to evaluate for lumbar radiculopathy vs peripheral nerve injury\par - Will do trial of Gabapentin 100mg Qhs to titrate to 300mg Qhs. Reviewed patient's eGFR which is 32. Referencing UpToDate, max amount of Gabapentin is 900mg/day. Advised patient and daughter of risks/benefits of medication including sedation for which they understand. \par - PT 2-3x/week for stretching, strengthening (especially of core muscles), ROM exercises, HEP and modalities PRN including myofascial release, moist heat, and TENS therapy \par \par RTC in 3-4 weeks or sooner if needed. Patient and daughter aware of red flag signs including any changes to their bowel/bladder control, groin numbness or new weakness. Patient knows to seek immediate attention by calling 911 or going to nearest ER if these symptoms appear.

## 2021-04-28 NOTE — PHYSICAL EXAM
[FreeTextEntry1] : PE:\par Constitutional: In NAD, calm and cooperative\par HEENT: NCAT, Anicteric sclera, no lid-lag\par Cardio: Extremities appear pink and well perfused, no peripheral edema\par Respiratory: Normal respiratory effort on room air, no accessory muscle use\par Skin: no rashes seen on exposed skin, no visible abrasions\par Psych: Normal affect, intact judgment and insight\par Neuro: Awake, alert and oriented x 3, see below for focused neurological exam\par MSK (Back)\par 	Inspection: no gross swelling identified\par 	Palpation: Tenderness of the left lower lumbar paraspinals and over left hip area\par 	ROM: Pain at end lumbar extension/flexion\par 	Strength: 5/5 strength in bilateral lower extremities\par 	Reflexes: 1+ Patella reflex bilaterally, 1+ Achilles reflex bilaterally, negative clonus bilaterally\par 	Sensation: Intact to light touch in bilateral lower extremities except for slightly diminished sensation over lateral left leg compared to right\par Special tests:\par SLR:positive on left\par MARIZA:negative bilaterally\par FADIR: negative bilaterally\par Facet loading:

## 2021-04-28 NOTE — HISTORY OF PRESENT ILLNESS
[FreeTextEntry1] : Ms. ADILSON GARNER is a 81 year old  female who presents with left leg pain. Patient referred by Dr. Zhang. \par \par Location:Left low back into left lower leg\par Onset:Chronic, on and off for a few years,  but worsening with time\par Provocation/Palliative:Worse with movement but also occurs at night. \par Quality:Sharp pain\par Radiation:Down left leg in L5 distribution\par Severity:moderate to severe now\par Timing:Worsening with time\par \par Denies and N/T but says there is a weird sensation over left lateral calf. Denies any associated leg weakness. Denies any loss of bowel/bladder control or any groin numbness.\par Previous medications trialed:Mobic without relief. \par Previous procedures relevant to complaint:None\par Conservative therapy tried?:PT with temporary relief

## 2021-05-03 ENCOUNTER — APPOINTMENT (OUTPATIENT)
Dept: PHYSICAL MEDICINE AND REHAB | Facility: CLINIC | Age: 81
End: 2021-05-03
Payer: MEDICARE

## 2021-05-03 VITALS
HEART RATE: 72 BPM | TEMPERATURE: 97.2 F | SYSTOLIC BLOOD PRESSURE: 185 MMHG | DIASTOLIC BLOOD PRESSURE: 90 MMHG | WEIGHT: 165 LBS | BODY MASS INDEX: 31.15 KG/M2 | HEIGHT: 61 IN

## 2021-05-03 PROCEDURE — 95886 MUSC TEST DONE W/N TEST COMP: CPT | Mod: LT

## 2021-05-03 PROCEDURE — 95908 NRV CNDJ TST 3-4 STUDIES: CPT

## 2021-05-10 ENCOUNTER — APPOINTMENT (OUTPATIENT)
Dept: PHYSICAL MEDICINE AND REHAB | Facility: CLINIC | Age: 81
End: 2021-05-10
Payer: MEDICARE

## 2021-05-10 VITALS
OXYGEN SATURATION: 99 % | BODY MASS INDEX: 32.39 KG/M2 | RESPIRATION RATE: 14 BRPM | SYSTOLIC BLOOD PRESSURE: 171 MMHG | HEART RATE: 64 BPM | WEIGHT: 165 LBS | DIASTOLIC BLOOD PRESSURE: 87 MMHG | TEMPERATURE: 97 F | HEIGHT: 60 IN

## 2021-05-10 DIAGNOSIS — M79.606 PAIN IN LEG, UNSPECIFIED: ICD-10-CM

## 2021-05-10 PROCEDURE — 99214 OFFICE O/P EST MOD 30 MIN: CPT

## 2021-05-10 NOTE — DATA REVIEWED
[FreeTextEntry1] : EMG LLE: evidence of moderate severe left L5 radiculopathy. \par \par MRI L Spine: multilevel spondylosis\par \par   MR Lumbar Spine No Cont             Final\par \par No Documents Attached\par \par \par Result Annotated 03May2021 04:34PM by NOEL LINARES\par \par \par Reviewed with patient. Will f/u with PCP regarding renal cysts. \par \par \par   EXAM:  MR SPINE LUMBAR\par \par \par PROCEDURE DATE:  04/28/2021\par \par \par \par INTERPRETATION:  LUMBOSACRAL SPINE MRI\par \par CLINICAL INFORMATION: Back pain and left lower extremity radicular symptoms.\par TECHNIQUE: Multiplanar, multisequence MR imaging was obtained of the lumbosacral spine.\par COMPARISON: None available.\par \par FINDINGS:\par \par SPINAL SEGMENTATION: The study assumes 5 non-rib bearing lumbar type vertebral bodies.\par SPINAL ALIGNMENT: Mild rightward curvature of the lumbar spine centered at the level of L3. Incompletely evaluated leftward curvature of the thoracolumbar spine. No spondylolisthesis.\par MARROW: Vertebral body heights are maintained.\par DISTAL CORD AND CONUS: Conus terminates at the level of the L1-2 intervertebral disc space.\par DISC SPACES: Loss of intervertebral disc height at all levels of the lumbar spine.\par PARASPINAL MUSCLE AND SOFT TISSUES: Mild atrophy and fatty infiltration of the posterior paraspinous musculature.\par INTRAABDOMINAL/INTRAPELVIC SOFT TISSUES: Bilateral renal cyst formation.\par \par DISC LEVEL EVALUATION:\par \par T12/L1: Seen only on the sagittal view. No central or neural foraminal stenosis.\par L1/L2: Disc bulge causes mild central stenosis with mild bilateral neural foraminal stenosis.\par L2/L3: Disc bulge and bilateral facet arthropathy causing moderate left neural foraminal stenosis. No central or right neural foraminal stenosis.\par L3/L4: Disc bulge and bilateral facet arthropathy causing mild central with mild right and moderate left neural foraminal stenosis.\par L4/L5: Disc bulge superimposed left paracentral disc protrusion and bilateral facet arthropathy causing severe narrowing of the left lateral recess. No neural foraminal stenosis.\par L5/S1: Disc bulge and bilateral facet arthropathy causing moderate right and mild left neural foraminal stenosis.\par \par IMPRESSION:\par 1.  Multilevel spondylosis of the lumbar spine, as described above.\par 2.  At L2-3 there is moderate left neural foraminal stenosis.\par 3.  At L3-4 there is mild central and mild right and moderate left neural foraminal stenosis.\par 4.  At L4-5 there is severe narrowing of the left lateral recess.\par 5.  At L5-S1 there is moderate right and mild left neural foraminal stenosis\par \par \par \par \par \par \par BUTCH RILEY MD; Attending Radiologist\par This document has been electronically signed. Apr 30 2021  3:34PM\par \par  \par \par  Ordered by: NOEL LINARES       Collected/Examined: 28Apr2021 05:32PM       \par Verified by: NOEL LINARES 03May2021 04:34PM       \par  Result Communication: No patient communication needed at this time;\par Stage: Final       \par  Performed at: [facility]       Resulted: 30Apr2021 03:25PM       Last Updated: 03May2021 04:34PM       Accession: [id number]

## 2021-05-10 NOTE — HISTORY OF PRESENT ILLNESS
[FreeTextEntry1] : Ms. ADILSON GARNER is a 81 year old  female who presents for follow up. At last visit, patient was ordered an MRI, EMG and started on PT/Gabapentin. The Gabapentin has helped with the pain but she is still getting spasms in her left leg. Denies any new symptoms. \par \par Location:Left low back into left lower leg\par Onset:Chronic, on and off for a few years, but worsening with time\par Provocation/Palliative:Worse with movement but also occurs at night. \par Quality:Sharp pain\par Radiation:Down left leg in L5 distribution\par Severity:moderate right now\par \par No bowel/bladder changes. No groin numbness.

## 2021-05-10 NOTE — PHYSICAL EXAM
[FreeTextEntry1] : PE:\par Constitutional: In NAD, calm and cooperative\par HEENT: NCAT, Anicteric sclera, no lid-lag\par Cardio: Extremities appear pink and well perfused, no peripheral edema\par Respiratory: Normal respiratory effort on room air, no accessory muscle use\par Skin: no rashes seen on exposed skin, no visible abrasions\par Psych: Normal affect, intact judgment and insight\par Neuro: Awake, alert and oriented x 3, see below for focused neurological exam\par MSK (Back)\par 	Inspection: no gross swelling identified\par 	Palpation: Tenderness of the left lower lumbar paraspinals and over left hip area\par 	ROM: Pain at end lumbar extension/flexion\par 	Strength: 5/5 strength in bilateral lower extremities except for L ankle dorsiflexion which is 4/5\par 	Reflexes: 1+ Patella reflex bilaterally, 1+ Achilles reflex bilaterally, negative clonus bilaterally\par 	Sensation: Intact to light touch in bilateral lower extremities except for slightly diminished sensation over lateral left leg compared to right\par Special tests:\par SLR:positive on left

## 2021-05-10 NOTE — ASSESSMENT
[FreeTextEntry1] : Ms. ADILSON GARNER is a 81 year old female who presents with left leg pain with evidence of left L5 radiculopathy on MRI. Denies any bowel/bladder issues. Will recommend:\par - Discussed with patient the risks (including but not limited to bleeding, infection, nerve damage, etc), benefits and alternatives to an epidural steroid injection for which patient understands. Will proceed with a left L5 TFESI. Will need PCP clearance. \par - Patient will continue Gabapentin Qhs for now. Tolerating medication well. \par \par Return for procedure. Patient aware of red flag signs including any changes to their bowel/bladder control, groin numbness or new weakness. Patient knows to seek immediate attention by calling 911 or going to nearest ER if these symptoms appear.

## 2021-05-11 ENCOUNTER — APPOINTMENT (OUTPATIENT)
Dept: FAMILY MEDICINE | Facility: CLINIC | Age: 81
End: 2021-05-11
Payer: MEDICARE

## 2021-05-11 ENCOUNTER — NON-APPOINTMENT (OUTPATIENT)
Age: 81
End: 2021-05-11

## 2021-05-11 VITALS
BODY MASS INDEX: 32.39 KG/M2 | HEART RATE: 78 BPM | HEIGHT: 60 IN | SYSTOLIC BLOOD PRESSURE: 130 MMHG | DIASTOLIC BLOOD PRESSURE: 80 MMHG | TEMPERATURE: 98.6 F | OXYGEN SATURATION: 98 % | WEIGHT: 165 LBS

## 2021-05-11 VITALS — SYSTOLIC BLOOD PRESSURE: 124 MMHG | DIASTOLIC BLOOD PRESSURE: 80 MMHG

## 2021-05-11 DIAGNOSIS — Z01.818 ENCOUNTER FOR OTHER PREPROCEDURAL EXAMINATION: ICD-10-CM

## 2021-05-11 PROCEDURE — 93000 ELECTROCARDIOGRAM COMPLETE: CPT

## 2021-05-11 PROCEDURE — 99214 OFFICE O/P EST MOD 30 MIN: CPT | Mod: 25

## 2021-05-11 NOTE — ASSESSMENT
[Patient Optimized for Surgery] : Patient optimized for surgery [No Further Testing Recommended] : no further testing recommended [Modify anticoagulant treatment prior to procedure] : Modify anticoagulant treatment prior to procedure [Modify anti-platelet treatment prior to procedure] : Modify anti-platelet treatment prior to procedure [Continue medications as is] : Continue current medications [As per surgery] : as per surgery [FreeTextEntry4] : Reviewed hx with patient, no contradictions.

## 2021-05-11 NOTE — PLAN
[FreeTextEntry1] : \par - Pt cleared for procedure pending blood work\par - Labs drawn in office today.\par

## 2021-05-11 NOTE — ADDENDUM
[FreeTextEntry1] : I, Aracelis Genao acting as a scribe for Dr. Olive Hughes on May 11, 2021  at 10:31 AM\par

## 2021-05-11 NOTE — END OF VISIT
[FreeTextEntry3] : Medical record entries made by the scribe today today, were at my direction and personally dictated to them by me, Dr. Olive Hughes on May 11, 2021. I have reviewed the chart and agree that the record accurately reflects my personal performance of the history, physical exam, assessment, and plan.\par

## 2021-05-11 NOTE — HISTORY OF PRESENT ILLNESS
[No Pertinent Cardiac History] : no history of aortic stenosis, atrial fibrillation, coronary artery disease, recent myocardial infarction, or implantable device/pacemaker [No Pertinent Pulmonary History] : no history of asthma, COPD, sleep apnea, or smoking [No Adverse Anesthesia Reaction] : no adverse anesthesia reaction in self or family member [(Patient denies any chest pain, claudication, dyspnea on exertion, orthopnea, palpitations or syncope)] : Patient denies any chest pain, claudication, dyspnea on exertion, orthopnea, palpitations or syncope [Chronic Anticoagulation] : no chronic anticoagulation [Chronic Kidney Disease] : no chronic kidney disease [Diabetes] : no diabetes [FreeTextEntry1] : Left L5 TFESI [FreeTextEntry2] : unknown  [FreeTextEntry3] : Dr. Manzanares  [FreeTextEntry4] : ADILSON is a 81 year female here for medical clearance. Pt will be getting a left L5 TFESI with Dr. Manzanares. Reports they wont schedule procedure until she gets clearance. Feels well today and has no acute complaints today. \par

## 2021-05-12 LAB
ALBUMIN SERPL ELPH-MCNC: 3.9 G/DL
ALP BLD-CCNC: 125 U/L
ALT SERPL-CCNC: 10 U/L
ANION GAP SERPL CALC-SCNC: 12 MMOL/L
APPEARANCE: CLEAR
APTT BLD: 33.5 SEC
AST SERPL-CCNC: 17 U/L
BACTERIA: NEGATIVE
BASOPHILS # BLD AUTO: 0.11 K/UL
BASOPHILS NFR BLD AUTO: 1.4 %
BILIRUB SERPL-MCNC: 0.4 MG/DL
BILIRUBIN URINE: NEGATIVE
BLOOD URINE: NEGATIVE
BUN SERPL-MCNC: 28 MG/DL
CALCIUM SERPL-MCNC: 9.6 MG/DL
CHLORIDE SERPL-SCNC: 109 MMOL/L
CHOLEST SERPL-MCNC: 267 MG/DL
CO2 SERPL-SCNC: 21 MMOL/L
COLOR: COLORLESS
CREAT SERPL-MCNC: 1.41 MG/DL
EOSINOPHIL # BLD AUTO: 0.68 K/UL
EOSINOPHIL NFR BLD AUTO: 8.5 %
GLUCOSE QUALITATIVE U: NEGATIVE
GLUCOSE SERPL-MCNC: 96 MG/DL
HCT VFR BLD CALC: 43.3 %
HDLC SERPL-MCNC: 60 MG/DL
HGB BLD-MCNC: 12.9 G/DL
HYALINE CASTS: 1 /LPF
IMM GRANULOCYTES NFR BLD AUTO: 0.3 %
INR PPP: 0.97 RATIO
KETONES URINE: NEGATIVE
LDLC SERPL CALC-MCNC: 178 MG/DL
LEUKOCYTE ESTERASE URINE: NEGATIVE
LYMPHOCYTES # BLD AUTO: 1.6 K/UL
LYMPHOCYTES NFR BLD AUTO: 20.1 %
MAN DIFF?: NORMAL
MCHC RBC-ENTMCNC: 28.2 PG
MCHC RBC-ENTMCNC: 29.8 GM/DL
MCV RBC AUTO: 94.5 FL
MICROSCOPIC-UA: NORMAL
MONOCYTES # BLD AUTO: 0.58 K/UL
MONOCYTES NFR BLD AUTO: 7.3 %
NEUTROPHILS # BLD AUTO: 4.99 K/UL
NEUTROPHILS NFR BLD AUTO: 62.4 %
NITRITE URINE: NEGATIVE
NONHDLC SERPL-MCNC: 208 MG/DL
PH URINE: 6.5
PLATELET # BLD AUTO: 250 K/UL
POTASSIUM SERPL-SCNC: 4.4 MMOL/L
PROT SERPL-MCNC: 7.1 G/DL
PROTEIN URINE: ABNORMAL
PT BLD: 11.5 SEC
RBC # BLD: 4.58 M/UL
RBC # FLD: 14.6 %
RED BLOOD CELLS URINE: 0 /HPF
SODIUM SERPL-SCNC: 143 MMOL/L
SPECIFIC GRAVITY URINE: 1.01
SQUAMOUS EPITHELIAL CELLS: 0 /HPF
TRIGL SERPL-MCNC: 146 MG/DL
UROBILINOGEN URINE: NORMAL
WBC # FLD AUTO: 7.98 K/UL
WHITE BLOOD CELLS URINE: 0 /HPF

## 2021-05-24 ENCOUNTER — RX RENEWAL (OUTPATIENT)
Age: 81
End: 2021-05-24

## 2021-06-02 ENCOUNTER — RX RENEWAL (OUTPATIENT)
Age: 81
End: 2021-06-02

## 2021-06-03 ENCOUNTER — APPOINTMENT (OUTPATIENT)
Dept: PHYSICAL MEDICINE AND REHAB | Facility: AMBULATORY SURGERY CENTER | Age: 81
End: 2021-06-03
Payer: MEDICARE

## 2021-06-03 PROCEDURE — 64483 NJX AA&/STRD TFRM EPI L/S 1: CPT | Mod: LT

## 2021-06-16 ENCOUNTER — APPOINTMENT (OUTPATIENT)
Dept: PHYSICAL MEDICINE AND REHAB | Facility: CLINIC | Age: 81
End: 2021-06-16
Payer: MEDICARE

## 2021-06-16 VITALS
TEMPERATURE: 98 F | HEIGHT: 61 IN | DIASTOLIC BLOOD PRESSURE: 92 MMHG | WEIGHT: 164 LBS | SYSTOLIC BLOOD PRESSURE: 163 MMHG | HEART RATE: 60 BPM | RESPIRATION RATE: 14 BRPM | BODY MASS INDEX: 30.96 KG/M2

## 2021-06-16 PROCEDURE — 99213 OFFICE O/P EST LOW 20 MIN: CPT

## 2021-06-16 NOTE — HISTORY OF PRESENT ILLNESS
[FreeTextEntry1] : Ms. ADILSON GARNER is a 81 year old  female who presents for follow up. Patient s/p a left L5-S1 TFESI on 6/3/21. Patient says she got 90% pain relief from the injection but still complaining of some numbness on outside of left calf. Overall she is more comfortable. Her limp has improved but still makes her uneasy walking long distances. \par \par Location:Left low back into left lower leg\par Onset:Chronic, on and off for a few years, but worsening with time\par Provocation/Palliative:Worse with movement but also occurs at night. \par Quality:Sharp pain\par Radiation:Down left leg in L5 distribution\par Severity:1/10\par \par No bowel/bladder changes. No groin numbness.

## 2021-06-16 NOTE — PHYSICAL EXAM
[FreeTextEntry1] : PE:\par Constitutional: In NAD, calm and cooperative\par HEENT: NCAT, Anicteric sclera, no lid-lag\par Cardio: Extremities appear pink and well perfused, no peripheral edema\par Respiratory: Normal respiratory effort on room air, no accessory muscle use\par Skin: no rashes seen on exposed skin, no visible abrasions\par Psych: Normal affect, intact judgment and insight\par Neuro: Awake, alert and oriented x 3, see below for focused neurological exam\par MSK (Back)\par 	Inspection: no gross swelling identified, no erythema or swelling around injection site\par 	Palpation: No tenderness of the left lower lumbar paraspinals or over left hip area\par 	Strength: 5/5 strength in bilateral lower extremities except for L ankle dorsiflexion which is 4/5\par 	Reflexes: 1+ Patella reflex bilaterally, 1+ Achilles reflex bilaterally, negative clonus bilaterally\par 	Sensation: Intact to light touch in bilateral lower extremities

## 2021-06-29 ENCOUNTER — NON-APPOINTMENT (OUTPATIENT)
Age: 81
End: 2021-06-29

## 2021-07-05 ENCOUNTER — RX RENEWAL (OUTPATIENT)
Age: 81
End: 2021-07-05

## 2021-07-13 ENCOUNTER — APPOINTMENT (OUTPATIENT)
Dept: ORTHOPEDIC SURGERY | Facility: CLINIC | Age: 81
End: 2021-07-13
Payer: MEDICARE

## 2021-07-13 VITALS
SYSTOLIC BLOOD PRESSURE: 167 MMHG | BODY MASS INDEX: 30.96 KG/M2 | HEART RATE: 60 BPM | DIASTOLIC BLOOD PRESSURE: 89 MMHG | WEIGHT: 164 LBS | HEIGHT: 61 IN

## 2021-07-13 PROCEDURE — 72100 X-RAY EXAM L-S SPINE 2/3 VWS: CPT

## 2021-07-13 PROCEDURE — 99215 OFFICE O/P EST HI 40 MIN: CPT

## 2021-07-13 NOTE — DISCUSSION/SUMMARY
[de-identified] : Anticipatory guidance regarding radiculopathy and dorsi flexion weakness was given. Based on her wishes to continue with conservative care I have referred her back to Dr. Manzanares for continuation of injection therapy. \par \par Prior to appointment and during encounter with patient extensive medical records were reviewed including but not limited to, hospital records, out patient records, imaging results, and lab data. During this appointment the patient was examined, diagnoses were discussed and explained in a face to face manner. In addition extensive time was spent reviewing aforementioned diagnostic studies. Counseling including abnormal image results, differential diagnoses, treatment options, risk and benefits, lifestyle changes, current condition, and current medications was performed. Patient's comments, questions, and concerns were address and patient verbalized understanding. Based on this patient's presentation at our office, which is an orthopedic spine surgeon's office, this patient inherently / intrinsically has a risk, however minute, of developing  issues such as Cauda equina syndrome, bowel and bladder changes, or progression of motor or neurological deficits such as paralysis which may be permanent. \par \par Patient with multiple medical comorbidity increasing the risk of perioperative and postoperative complications as well as diminished spine outcomes as per the current medical literature.  These include but are not limited to obesity, anxiety/depression, cardiac illness, kidney disease, peripheral vascular disease, history of cancer, COPD, dysmetabolic syndrome including but not limited to diabetes, hyperlipidemia, hypertension.  Patient is being referred back to primary care provider for medical optimization, as well as other appropriate specialists as needed for optimization prior to spine surgery. \par \par A long discussion was had with the patient regarding Lumbar surgical plan of a L4-L5 laminectomy, partial facetectomy, and foraminotomy to address her radiculopathy and foot drop. Anatomic models, Xrays, CT scans/MRI’s were utilized to provide a firm understanding of their surgical plan. Patient is aware that surgery is elective in nature and he choosing to proceed with surgery. Risks, benefits, alternatives were discussed and all questions, comments and concerns were encouraged and answered to the patient's satisfaction. The statistical probability of improvement was discussed at length as well as post surgical course. Literature from [employer] spine society was provided to the patient regarding the specific type of surgery as well as a 5 page written surgical consent which the patient will need to sign and return to the office prior to surgical date. Consent forms highlight specific complications related to the complex nature of spinal surgery\par  \par Risks of lumbar surgery include: persistent pain, adjacent segment disease (which will require more surgery in future), dural tears, neurologic injury, and wound healing complications\par  \par Benefits of lumbar surgery include Improved neurologic and pain score\par  \par We also discussed with the patient complications of incisions directly related to obesity, diabetes, previous wound complications or post-surgical wound infections, smoking, neuropathy, and chronic anticoagulation. This risk has been specifically discussed and the patient will discuss modifiable risk factors to be optimized prior to surgical management. A multimodality approach of primary care physician, and medicine subspecialist will be utilized to optimize medical risk factors.\par  \par If patient is a smoker, discontinuation of smoking was advised and must be accomplished 6-8 weeks prior to surgery date. Patient was advised that help with quitting smoking is available through [facility] Smoker's Quit Line and phone number/website was provided, or patient can ask assistance from primary care provider. Elective surgery will not be performed unless patient complies with this policy.\par \par Medical comorbidities including but not limited to diabetes, coronary artery disease, renal insufficiency, uncontrolled hypertension, rheumatoid arthritis, auto-immune disorders, COPD/asthma and/or history of radiation, chemotherapy, being on anticoagulants, chronic steroids, immune modulators, have increased statistical chance of leading to increased hospital stay, protracted recovery period, need for acute or subacute rehabilitation post-operative. There can be increased probability of post-surgical and medical complications including but not limited to surgical site infection, need for revision surgery, deep vein thrombosis, pulmonary embolism, exacerbation of COPD/asthma, pneumonia, UTI, urinary retention, and ileus. \par

## 2021-07-13 NOTE — HISTORY OF PRESENT ILLNESS
[de-identified] : 81 year old F presents for an initial evaluation and a second opinion of L4-L5 severe stenosis, she has seen Dr. Manzanares for this issue as well. She is suffering from LLE L5 radiculopathy. She states she walks with a limp secondary to a subtle foot drop. NE questionnaire negative. She states she had 90% improvement with the last injection. [Ataxia] : no ataxia [Incontinence] : no incontinence [Loss of Dexterity] : good dexterity [Urinary Ret.] : no urinary retention

## 2021-07-13 NOTE — PHYSICAL EXAM
[Obese] : obese [Antalgic] : antalgic [Poor Appearance] : well-appearing [Acute Distress] : not in acute distress [de-identified] : CONSTITUTIONAL: The patient is a very pleasant individual who is well-nourished and who appears stated age.\par PSYCHIATRIC: The patient is alert and oriented X 3 and in no apparent distress, and participates with orthopedic evaluation well.\par HEAD: Atraumatic and is nonsyndromic in appearance.\par EENT: No visible thyromegaly, EOMI.\par RESPIRATORY: Respiratory rate is regular, not dyspneic on examination.\par LYMPHATICS: There is no inguinal lymphadenopathy\par INTEGUMENTARY: Skin is clean, dry, and intact about the bilateral lower extremities and lumbar spine.\par VASCULAR: There is brisk capillary refill about the bilateral lower extremities.\par NEUROLOGIC: There are no pathologic reflexes. There is no decrease in sensation of the bilateral lower extremities on Wartenberg pinwheel examination. Deep tendon reflexes are well maintained at 2+/4 of the bilateral lower extremities and are symmetric. L5 radiculopathy on the left. MUSCULOSKELETAL: There is no visible muscular atrophy. Range of motion of lumbar spine is well maintained. The patient ambulates in a non-myelopathic manner. Negative tension sign and straight leg raise bilaterally. Quad extension, EHL, plantar flexion, and ankle eversion are well preserved. Normal secondary orthopaedic exam of bilateral hips knees and ankles. 4/5 dorsi flexion on the left. Left greater trochanteric bursitis, ambulates with a limp I think is multifactorial with greater trochanteric bursitis, a foot drop, and her age.  [de-identified] : Xray of the lumbar spine taken 07/13/2021 demonstrates a rotoscoliosis with a convexity to the right, on sagittal view there is lumbar degenerative disc disease and narrowing at L4-L5 and L5-S1. \par \par Tibial Xray taken in 2019 was reviewed which demonstrates no acute fracture or dislocation. There is no significant malalignment. No significant other obvious osseous abnormality, otherwise unremarkable. \par \par MRI of the lumbar spine taken at [facility] on 04- demonstrates multiple levels of spondylosis and L4-L5 left lateral recess stenosis

## 2021-07-13 NOTE — ADDENDUM
[FreeTextEntry1] : Documented by Galdino Bautista acting as a scribe for Dr. Cristofer Walker on 07/13/2021. All medical record entries made by the Scribe were at my, Dr. Cristofer Walker, direction and personally dictated by me on 07/13/2021 . I have reviewed the chart and agree that the record accurately reflects my personal performance of the history, physical exam, assessment and plan. I have also personally directed, reviewed, and agreed with the chart.

## 2021-07-15 ENCOUNTER — APPOINTMENT (OUTPATIENT)
Dept: PHYSICAL MEDICINE AND REHAB | Facility: CLINIC | Age: 81
End: 2021-07-15
Payer: MEDICARE

## 2021-07-15 VITALS
BODY MASS INDEX: 30.96 KG/M2 | RESPIRATION RATE: 14 BRPM | WEIGHT: 164 LBS | DIASTOLIC BLOOD PRESSURE: 91 MMHG | SYSTOLIC BLOOD PRESSURE: 183 MMHG | TEMPERATURE: 98 F | HEART RATE: 60 BPM | HEIGHT: 61 IN

## 2021-07-15 PROCEDURE — 99214 OFFICE O/P EST MOD 30 MIN: CPT | Mod: GC

## 2021-07-15 NOTE — REVIEW OF SYSTEMS
[Negative] : Genitourinary [FreeTextEntry4] : +hearing aide  [FreeTextEntry9] : +LBP [de-identified] : +radiating pain to LLE

## 2021-07-15 NOTE — HISTORY OF PRESENT ILLNESS
[FreeTextEntry1] : Ms. AIDLSON GARNER is a 81 year old  female who presents for follow up. Patient s/p a left L5-S1 TFESI on 6/3/21 with 90% relief endorsed.  Patient with pain returning again on 6/28. Seen by ortho spine on 7/13. Patient stated that since sleeping upright and starting on the gabapentin, she has not had significant pain for the past week. Denies any new numbness/tingling and weakness. She says the gabapentin definitely helps but she would like to be off of it if possible. \par \par Location:Left low back into left lower leg\par Onset:Chronic, on and off for a few years, but worsening with time\par Provocation/Palliative:Worse with movement but also occurs at night. \par Quality:Sharp pain\par Radiation:Down left leg in L5 distribution\par Severity:mild at this time\par \par No bowel/bladder changes. No groin numbness.

## 2021-07-15 NOTE — PHYSICAL EXAM
[FreeTextEntry1] : PE:\par Constitutional: In NAD, calm and cooperative\par HEENT: NCAT, Anicteric sclera, no lid-lag\par Cardio: Extremities appear pink and well perfused, no peripheral edema\par Respiratory: Normal respiratory effort on room air, no accessory muscle use\par Skin: no rashes seen on exposed skin, no visible abrasions\par Psych: Normal affect, intact judgment and insight\par Neuro: Awake, alert and oriented x 3, see below for focused neurological exam\par MSK (Back)\par 	Inspection: no gross swelling identified, no erythema or swelling around injection site\par 	Palpation: No tenderness of the left lower lumbar paraspinals or over left hip area\par 	Strength: 5/5 strength in bilateral lower extremities\par 	Reflexes: 1+ Patella reflex bilaterally, 1+ Achilles reflex bilaterally, negative clonus bilaterally\par 	Sensation: Intact to light touch in bilateral lower extremities

## 2021-07-15 NOTE — ASSESSMENT
[FreeTextEntry1] : Ms. 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 Patient s/p AMMON on 6/3/21 with almost complete reduction of her pain but still with some residual numbness/difficulty walking long distances. Pain came back again 3+ weeks later, was recommended to restart gabapentin 100mg qhs. Saw ortho spine who recommended another AMMON. Patient's pain now better controlled but would like to get off gabapentin. Denies any bowel/bladder issues. Will recommend:\par -Repeat AMMON. Discussed with patient the risks (including but not limited to bleeding, infection, nerve damage, etc), benefits and alternatives to an epidural steroid injection for which patient understands. Will schedule for a Lumbar Transforaminal Epidural Steroid Injection at Left L5-S1 level. She is COVID vaccinated. \par -Will discontinue gabapentin if AMMON provides significant sustained relief. \par - Patient will f/u with PCP regarding her elevated BP today, patient asymptomatic. Knows when to go to ED. \par \par \par Return for procedure. Patient aware of red flag signs including any changes to their bowel/bladder control, groin numbness or new weakness. Patient knows to seek immediate attention by calling 911 or going to nearest ER if these symptoms appear. \par \par \par \par

## 2021-08-05 ENCOUNTER — APPOINTMENT (OUTPATIENT)
Dept: PHYSICAL MEDICINE AND REHAB | Facility: AMBULATORY SURGERY CENTER | Age: 81
End: 2021-08-05
Payer: MEDICARE

## 2021-08-05 ENCOUNTER — RX RENEWAL (OUTPATIENT)
Age: 81
End: 2021-08-05

## 2021-08-05 PROCEDURE — 64483 NJX AA&/STRD TFRM EPI L/S 1: CPT | Mod: LT

## 2021-08-09 ENCOUNTER — RX RENEWAL (OUTPATIENT)
Age: 81
End: 2021-08-09

## 2021-08-19 ENCOUNTER — APPOINTMENT (OUTPATIENT)
Dept: RADIOLOGY | Facility: CLINIC | Age: 81
End: 2021-08-19
Payer: MEDICARE

## 2021-08-19 ENCOUNTER — APPOINTMENT (OUTPATIENT)
Dept: PHYSICAL MEDICINE AND REHAB | Facility: CLINIC | Age: 81
End: 2021-08-19
Payer: MEDICARE

## 2021-08-19 ENCOUNTER — RESULT REVIEW (OUTPATIENT)
Age: 81
End: 2021-08-19

## 2021-08-19 ENCOUNTER — OUTPATIENT (OUTPATIENT)
Dept: OUTPATIENT SERVICES | Facility: HOSPITAL | Age: 81
LOS: 1 days | End: 2021-08-19
Payer: MEDICARE

## 2021-08-19 VITALS
WEIGHT: 164 LBS | SYSTOLIC BLOOD PRESSURE: 144 MMHG | HEART RATE: 57 BPM | BODY MASS INDEX: 30.96 KG/M2 | HEIGHT: 61 IN | DIASTOLIC BLOOD PRESSURE: 84 MMHG

## 2021-08-19 DIAGNOSIS — M70.62 TROCHANTERIC BURSITIS, LEFT HIP: ICD-10-CM

## 2021-08-19 PROCEDURE — 73502 X-RAY EXAM HIP UNI 2-3 VIEWS: CPT | Mod: 26,LT

## 2021-08-19 PROCEDURE — 73502 X-RAY EXAM HIP UNI 2-3 VIEWS: CPT

## 2021-08-19 PROCEDURE — 99214 OFFICE O/P EST MOD 30 MIN: CPT

## 2021-08-19 NOTE — HISTORY OF PRESENT ILLNESS
[FreeTextEntry1] : Ms. ADILSON GARNER is a 81 year old  female who presents for follow up. Patient received a left L5-S1 TFESI on 8/5/21. Patient reports relief from her radicular symptoms, but now complaining of more L hip pain. Denies any side effects from injection. Denies any other new symptoms. She occasionally takes Advil at night. \par \par Location:Left lateral hip area\par Onset:Chronic, on and off for a few years, but worsening with time\par Provocation/Palliative:Worse with movement and standing, no pain at night\par Quality:Sharp pain\par Radiation:None right now\par Severity:mild at this time\par \par No bowel/bladder changes. No groin numbness.

## 2021-08-19 NOTE — PHYSICAL EXAM
[FreeTextEntry1] : PE:\par Constitutional: In NAD, calm and cooperative\par HEENT: NCAT, Anicteric sclera, no lid-lag\par Cardio: Extremities appear pink and well perfused, no peripheral edema\par Respiratory: Normal respiratory effort on room air, no accessory muscle use\par Skin: no rashes seen on exposed skin, no visible abrasions\par Psych: Normal affect, intact judgment and insight\par Neuro: Awake, alert and oriented x 3, see below for focused neurological exam\par MSK:\par 	Inspection: no gross swelling identified, no erythema or swelling around injection site\par 	Palpation: No tenderness of the left lower lumbar paraspinals, Mild TTP over left GTB\par 	Strength: 5/5 strength in bilateral lower extremities\par 	Reflexes: 1+ Patella reflex bilaterally, 1+ Achilles reflex bilaterally, negative clonus bilaterally\par 	Sensation: Intact to light touch in bilateral lower extremities\par Special tests:\par MARIZA: positive on left\par FADIR: negative on left\par SLR: negative on left

## 2021-08-19 NOTE — ASSESSMENT
[FreeTextEntry1] : Ms. 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 Patient s/p AMMON x 2 with good reduction in her radicular symptoms but now having more lateral hip pain likely 2/2 to greater trochanteric bursitis.  Denies any bowel/bladder issues. Will recommend:\par - Discussed with patient the risks (including but not limited to bleeding, infection, nerve damage, etc), benefits and alternatives to a left greater trochanteric bursa injection under US for which patient understands. \par - Advised patient to try Tylenol, up to 3g/day instead of Advil for pain relief as needed, for which she agreed. \par \par \par Return for procedure. Patient aware of red flag signs including any changes to their bowel/bladder control, groin numbness or new weakness. Patient knows to seek immediate attention by calling 911 or going to nearest ER if these symptoms appear. \par \par \par \par

## 2021-08-28 ENCOUNTER — RX RENEWAL (OUTPATIENT)
Age: 81
End: 2021-08-28

## 2021-09-09 ENCOUNTER — APPOINTMENT (OUTPATIENT)
Dept: PHYSICAL MEDICINE AND REHAB | Facility: CLINIC | Age: 81
End: 2021-09-09
Payer: MEDICARE

## 2021-09-09 VITALS
BODY MASS INDEX: 31.15 KG/M2 | DIASTOLIC BLOOD PRESSURE: 81 MMHG | WEIGHT: 165 LBS | SYSTOLIC BLOOD PRESSURE: 136 MMHG | HEART RATE: 66 BPM | HEIGHT: 61 IN | TEMPERATURE: 98.1 F

## 2021-09-09 PROCEDURE — 20610 DRAIN/INJ JOINT/BURSA W/O US: CPT | Mod: LT

## 2021-09-09 NOTE — PROCEDURE
[de-identified] : Reason for procedure: LEFT greater trochanteric bursitis\par \par Procedure: \par 1. LEFT greater trochanteric bursal injection\par \par Physician: Tyrone Manzanares D.O.\par Medication injected: 40 mg Kenalog, 3 cc Lidocaine 1%\par Sedation medications: None\par Estimated blood loss: None\par Complications: None\par \par Technique: R/B/A to LEFT HIP GREATER TROCHANTERIC BURSAL CSI reviewed with patient.  The patient is agreeable and wishes to proceed.   The patient was placed in side lying position with the affected LEFT side facing up.  The area was prepped in usual sterile fashion with Chloroprep.  A 25 gauge, 1.5 inch needle was advanced into the L greater trochanteric bursa.  After negative aspiration of heme, the above medications were injected into the bursa.  Needle was then removed and a band aid placed over the injection site. There were no complications.  The patient was provided with post injection instructions.

## 2021-09-09 NOTE — ASSESSMENT
[FreeTextEntry1] : Ms. 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 Patient s/p AMMON x 2 with good reduction in her radicular symptoms but now having more lateral hip pain likely 2/2 to greater trochanteric bursitis.  Denies any bowel/bladder issues. Will recommend:\par - CSI to left greater trochanteric bursa done today, tolerated well. \par - Advised patient to try Tylenol, up to 3g/day for which she agreed. \par \par \par RTC in 6 weeks. Patient aware of red flag signs including any changes to their bowel/bladder control, groin numbness or new weakness. Patient knows to seek immediate attention by calling 911 or going to nearest ER if these symptoms appear. \par \par \par \par

## 2021-09-09 NOTE — HISTORY OF PRESENT ILLNESS
[FreeTextEntry1] : Ms. ADILSON GARNER is a 81 year old  female who presents for follow up. Overall she has been feeling pretty good but still having some left lateral hip pain. Denies any other new symptoms. No longer requiring a cane. \par \par Location:Left lateral hip area\par Onset:Chronic, on and off for a few years\par Provocation/Palliative:Worse with movement and standing, no pain at night\par Quality:Sharp pain\par Radiation:None right now\par Severity:mild at this time\par \par No bowel/bladder changes. No groin numbness.

## 2021-10-18 ENCOUNTER — RX RENEWAL (OUTPATIENT)
Age: 81
End: 2021-10-18

## 2021-11-18 ENCOUNTER — RX RENEWAL (OUTPATIENT)
Age: 81
End: 2021-11-18

## 2021-12-02 ENCOUNTER — RX RENEWAL (OUTPATIENT)
Age: 81
End: 2021-12-02

## 2022-01-02 ENCOUNTER — TRANSCRIPTION ENCOUNTER (OUTPATIENT)
Age: 82
End: 2022-01-02

## 2022-01-02 DIAGNOSIS — H92.09 OTALGIA, UNSPECIFIED EAR: ICD-10-CM

## 2022-01-03 ENCOUNTER — NON-APPOINTMENT (OUTPATIENT)
Age: 82
End: 2022-01-03

## 2022-01-03 ENCOUNTER — APPOINTMENT (OUTPATIENT)
Dept: FAMILY MEDICINE | Facility: CLINIC | Age: 82
End: 2022-01-03
Payer: MEDICARE

## 2022-01-03 DIAGNOSIS — U07.1 COVID-19: ICD-10-CM

## 2022-01-03 DIAGNOSIS — Z71.89 OTHER SPECIFIED COUNSELING: ICD-10-CM

## 2022-01-03 PROCEDURE — 99214 OFFICE O/P EST MOD 30 MIN: CPT | Mod: CS

## 2022-01-03 RX ORDER — AMOXICILLIN AND CLAVULANATE POTASSIUM 875; 125 MG/1; MG/1
875-125 TABLET, COATED ORAL
Qty: 14 | Refills: 0 | Status: COMPLETED | COMMUNITY
Start: 2022-01-02 | End: 2022-01-03

## 2022-01-03 RX ORDER — VALACYCLOVIR 500 MG/1
500 TABLET, FILM COATED ORAL
Qty: 21 | Refills: 0 | Status: ACTIVE | COMMUNITY
Start: 2022-01-03 | End: 1900-01-01

## 2022-01-03 NOTE — HISTORY OF PRESENT ILLNESS
[Home] : at home, [unfilled] , at the time of the visit. [Other Location: e.g. Home (Enter Location, City,State)___] : at [unfilled] [Family Member] : family member [Verbal consent obtained from patient] : the patient, [unfilled] [FreeTextEntry8] : 80 yo female \par \par s/p COVID-19 Dx on 12/31/21  Rapid antigen test at home\par symptom onset  12/29/21 \par +ve improving cough c clear/white phlegm , \par no fever/chills/sweats \par +ve nausea, +ve decreased appetite \par intact sense of smell/taste \par +ve mild diarrhea \par s/p COVID-19 Pfizer vaccine 6/2021    \par \par pt reports 2 day hx of R ear pain, followed by rash, and pain  R sided   Rx'ed Augmentin yesterday by on-call provider pt reports zero relief \par Denies SOB\par \par O2 sat= 97%  \par \par

## 2022-01-03 NOTE — REVIEW OF SYSTEMS
[Fatigue] : fatigue [Earache] : earache [Cough] : cough [Skin Rash] : skin rash [Negative] : Heme/Lymph [FreeTextEntry2] : see HPI  [FreeTextEntry4] : see HPI  [FreeTextEntry6] : see HPI  [de-identified] : see HPI  [de-identified] : Neuralgia R ear  R face  R post/lat neck region

## 2022-01-03 NOTE — PLAN
[FreeTextEntry1] : aggressive hydration!!!!!!!!\par close monitoring!!!\par REST!!\par see med orders \par \par Start OTC B12 1000mcg qd \par Start OTC Vit D 5KU daily \par Start OTC Vit C 1000mg qd \par Start Zinc 40mg qd \par NAC 600mg bid \par \par close monitoring!!!!!! aggressive hydration!!!!! see med orders.  cont OTC Tylenol 500-1000mg q6-8h prn if/for fever development and/or body aches  \par self quarantine x 14d \par \par instructed pt to pay careful attention at Day 5-10 (currently on Day 5) given risk of decompensation  \par \par pt's daughter instructed pt directly to ED if O2 sat falls below 94%, if fever > 101F, and/or unable to tolerate PO fluid intake

## 2022-01-03 NOTE — PHYSICAL EXAM
[No Acute Distress] : no acute distress [EOMI] : extraocular movements intact [No Respiratory Distress] : no respiratory distress  [No Accessory Muscle Use] : no accessory muscle use [Grossly Normal Strength/Tone] : grossly normal strength/tone [Coordination Grossly Intact] : coordination grossly intact [Speech Grossly Normal] : speech grossly normal [Normal Affect] : the affect was normal [Normal Mood] : the mood was normal [Normal Insight/Judgement] : insight and judgment were intact [de-identified] : +ve erythematous edematous R pinna R preauricular region and R side of neck    +ve TTP as per daughter and patient  [de-identified] : see ENT exam

## 2022-01-13 ENCOUNTER — NON-APPOINTMENT (OUTPATIENT)
Age: 82
End: 2022-01-13

## 2022-01-14 ENCOUNTER — RX RENEWAL (OUTPATIENT)
Age: 82
End: 2022-01-14

## 2022-01-14 ENCOUNTER — APPOINTMENT (OUTPATIENT)
Dept: FAMILY MEDICINE | Facility: CLINIC | Age: 82
End: 2022-01-14
Payer: MEDICARE

## 2022-01-14 VITALS — SYSTOLIC BLOOD PRESSURE: 126 MMHG | DIASTOLIC BLOOD PRESSURE: 74 MMHG

## 2022-01-14 VITALS
TEMPERATURE: 97.6 F | SYSTOLIC BLOOD PRESSURE: 130 MMHG | DIASTOLIC BLOOD PRESSURE: 70 MMHG | WEIGHT: 161 LBS | BODY MASS INDEX: 30.4 KG/M2 | HEART RATE: 97 BPM | OXYGEN SATURATION: 100 % | HEIGHT: 61 IN

## 2022-01-14 DIAGNOSIS — K21.9 GASTRO-ESOPHAGEAL REFLUX DISEASE W/OUT ESOPHAGITIS: ICD-10-CM

## 2022-01-14 PROCEDURE — 99213 OFFICE O/P EST LOW 20 MIN: CPT

## 2022-01-14 RX ORDER — PREDNISONE 20 MG/1
20 TABLET ORAL
Qty: 9 | Refills: 0 | Status: DISCONTINUED | COMMUNITY
Start: 2022-01-03 | End: 2022-01-14

## 2022-01-14 NOTE — HISTORY OF PRESENT ILLNESS
[Family Member] : family member [FreeTextEntry8] : 82 yo female presents with complaint of epigastric pain, 5-6/10 in severity, burning, worse at times with food. She says it began 3 - 4 days ago. She says it is intermittent. She has no pain/discomfort at this time. She was diagnosed with covid19 3 wks ago, she says symptoms such as cough/fever/chills have resolved. Denies fever, chills, cp, palpitations, sob, nv, heat/cold intolerance, dizziness, melena, hematochezia, muscle weakness, loss of sensation, bowel/bladder incontinence or calf pain.\par

## 2022-01-14 NOTE — ASSESSMENT
[FreeTextEntry1] : GERD: start ppi prn for reflux, avoid late night meals/snacks (2 - 3 hours before bedtime), avoid smoking, avoid tight clothing especially around stomach area, you can consider raising the head of your bed by 6 to 8 inches, avoid foods that worsen symptoms which can include coffee, chocolate, alcohol, peppermint, and fatty foods\par RTC 2 wks\par

## 2022-01-18 ENCOUNTER — RX RENEWAL (OUTPATIENT)
Age: 82
End: 2022-01-18

## 2022-02-10 ENCOUNTER — APPOINTMENT (OUTPATIENT)
Dept: FAMILY MEDICINE | Facility: CLINIC | Age: 82
End: 2022-02-10
Payer: MEDICARE

## 2022-02-10 VITALS
TEMPERATURE: 97.7 F | BODY MASS INDEX: 30.96 KG/M2 | HEIGHT: 61 IN | HEART RATE: 64 BPM | OXYGEN SATURATION: 98 % | SYSTOLIC BLOOD PRESSURE: 156 MMHG | WEIGHT: 164 LBS | DIASTOLIC BLOOD PRESSURE: 84 MMHG

## 2022-02-10 DIAGNOSIS — B02.9 ZOSTER W/OUT COMPLICATIONS: ICD-10-CM

## 2022-02-10 PROCEDURE — 99213 OFFICE O/P EST LOW 20 MIN: CPT

## 2022-02-10 NOTE — HISTORY OF PRESENT ILLNESS
[FreeTextEntry8] : 83yo F with recent shingles of right ear presents with decreased hearing of right ear. Pt reports she had shingles 1 month ago on her right ear and was treated with anti-viral and prednisone. Pt states rash and pain resolved, however continues to have decreased hearing in her right ear. 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 Pt reports family/friends tell her since the shingles she has gotten worse and can barely hear at all. Pt is concerned about future travel plans and her safety with minimal hearing. Pt denies taking any new medications of OTC medicines. \par Pt denies any tinnitus, pain, rash, HA, dizziness, ataxia, fevers, chills, nausea, vomiting, CP, palpitations, or SOB. \par

## 2022-02-10 NOTE — ASSESSMENT
[FreeTextEntry1] : Post-Shingles Hearing Loss:\par - pt took all of her medications as directed when she had shingles 1 month ago\par - resolution of shingles rash\par - baseline no hearing in left ear, decreased hearing in right with hearing aid\par - no signs of infection, normal ENT exam

## 2022-02-10 NOTE — PHYSICAL EXAM
[Normal Outer Ear/Nose] : the outer ears and nose were normal in appearance [Normal TMs] : both tympanic membranes were normal [Normal] : normal rate, regular rhythm, normal S1 and S2 and no murmur heard [No Rash] : no rash [Coordination Grossly Intact] : coordination grossly intact [No Focal Deficits] : no focal deficits [Normal Gait] : normal gait [Speech Grossly Normal] : speech grossly normal [Normal Affect] : the affect was normal [Normal Insight/Judgement] : insight and judgment were intact

## 2022-02-10 NOTE — REVIEW OF SYSTEMS
[Hearing Loss] : hearing loss [Negative] : Respiratory [Earache] : no earache [Hoarseness] : no hoarseness [Nasal Discharge] : no nasal discharge [Sore Throat] : no sore throat [Postnasal Drip] : no postnasal drip [Nausea] : no nausea [Vomiting] : no vomiting [Joint Pain] : no joint pain [Muscle Pain] : no muscle pain [Itching] : no itching [Skin Rash] : no skin rash [Headache] : no headache [Dizziness] : no dizziness [Fainting] : no fainting [Confusion] : no confusion [Unsteady Walking] : no ataxia [FreeTextEntry9] : m

## 2022-02-10 NOTE — PLAN
[FreeTextEntry1] : Hearing Loss 2/2 shingles \par - Take 20mg prednisone x 7 days \par - Follow up with audiologist about hearing aid adjustment \par - Follow up with ENT for assessment of worsening hearing loss\par - If symptoms worsen, develops pain, fevers, headaches, return to office

## 2022-02-28 ENCOUNTER — RX RENEWAL (OUTPATIENT)
Age: 82
End: 2022-02-28

## 2022-03-01 ENCOUNTER — NON-APPOINTMENT (OUTPATIENT)
Age: 82
End: 2022-03-01

## 2022-03-01 ENCOUNTER — APPOINTMENT (OUTPATIENT)
Dept: FAMILY MEDICINE | Facility: CLINIC | Age: 82
End: 2022-03-01
Payer: MEDICARE

## 2022-03-01 VITALS
HEART RATE: 51 BPM | HEIGHT: 61 IN | WEIGHT: 159 LBS | SYSTOLIC BLOOD PRESSURE: 148 MMHG | BODY MASS INDEX: 30.02 KG/M2 | OXYGEN SATURATION: 98 % | DIASTOLIC BLOOD PRESSURE: 82 MMHG | TEMPERATURE: 97.5 F

## 2022-03-01 PROCEDURE — G0442 ANNUAL ALCOHOL SCREEN 15 MIN: CPT | Mod: 59

## 2022-03-01 PROCEDURE — G0439: CPT

## 2022-03-01 PROCEDURE — G0444 DEPRESSION SCREEN ANNUAL: CPT

## 2022-03-01 PROCEDURE — 93000 ELECTROCARDIOGRAM COMPLETE: CPT | Mod: 59

## 2022-03-01 NOTE — PLAN
[FreeTextEntry1] : - Labs drawn in office today\par - Pt sent to Cardio for evaluation \par - Adv to avoid caffeine consumption \par - F/u by phone tm \par

## 2022-03-01 NOTE — REASON FOR VISIT
[Annual Wellness Visit] : an annual wellness visit [FreeTextEntry1] : [facility] annual wellness

## 2022-03-01 NOTE — HEALTH RISK ASSESSMENT
[Good] : ~his/her~  mood as  good [Never] : Never [1 or 2 (0 pts)] : 1 or 2 (0 points) [Never (0 pts)] : Never (0 points) [0] : 2) Feeling down, depressed, or hopeless: Not at all (0) [PHQ-2 Negative - No further assessment needed] : PHQ-2 Negative - No further assessment needed [None] : None [With Family] : lives with family [Retired] : retired [] :  [Feels Safe at Home] : Feels safe at home [Fully functional (bathing, dressing, toileting, transferring, walking, feeding)] : Fully functional (bathing, dressing, toileting, transferring, walking, feeding) [Fully functional (using the telephone, shopping, preparing meals, housekeeping, doing laundry, using] : Fully functional and needs no help or supervision to perform IADLs (using the telephone, shopping, preparing meals, housekeeping, doing laundry, using transportation, managing medications and managing finances) [Audit-CScore] : 0 [[id number]] : 0

## 2022-03-01 NOTE — END OF VISIT
[FreeTextEntry3] : Medical record entries made by the scribe today today, were at my direction and personally dictated to them by me, Dr. Olive Hughes on March 1, 2022. I have reviewed the chart and agree that the record accurately reflects my personal performance of the history, physical exam, assessment, and plan.\par

## 2022-03-01 NOTE — PHYSICAL EXAM
[No Acute Distress] : no acute distress [Well Nourished] : well nourished [Well Developed] : well developed [Well-Appearing] : well-appearing [Normal Sclera/Conjunctiva] : normal sclera/conjunctiva [PERRL] : pupils equal round and reactive to light [EOMI] : extraocular movements intact [Normal Outer Ear/Nose] : the outer ears and nose were normal in appearance [Normal Oropharynx] : the oropharynx was normal [No JVD] : no jugular venous distention [No Lymphadenopathy] : no lymphadenopathy [Supple] : supple [Thyroid Normal, No Nodules] : the thyroid was normal and there were no nodules present [No Respiratory Distress] : no respiratory distress  [No Accessory Muscle Use] : no accessory muscle use [Clear to Auscultation] : lungs were clear to auscultation bilaterally [Regular Rhythm] : with a regular rhythm [Normal S1, S2] : normal S1 and S2 [No Murmur] : no murmur heard [No Carotid Bruits] : no carotid bruits [No Abdominal Bruit] : a ~M bruit was not heard ~T in the abdomen [No Varicosities] : no varicosities [Pedal Pulses Present] : the pedal pulses are present [No Edema] : there was no peripheral edema [No Palpable Aorta] : no palpable aorta [No Extremity Clubbing/Cyanosis] : no extremity clubbing/cyanosis [Soft] : abdomen soft [Non Tender] : non-tender [Non-distended] : non-distended [No Masses] : no abdominal mass palpated [No HSM] : no HSM [Normal Bowel Sounds] : normal bowel sounds [Normal Posterior Cervical Nodes] : no posterior cervical lymphadenopathy [Normal Anterior Cervical Nodes] : no anterior cervical lymphadenopathy [No CVA Tenderness] : no CVA  tenderness [No Spinal Tenderness] : no spinal tenderness [No Joint Swelling] : no joint swelling [Grossly Normal Strength/Tone] : grossly normal strength/tone [No Rash] : no rash [Coordination Grossly Intact] : coordination grossly intact [No Focal Deficits] : no focal deficits [Normal Gait] : normal gait [Deep Tendon Reflexes (DTR)] : deep tendon reflexes were 2+ and symmetric [Normal Affect] : the affect was normal [Normal Insight/Judgement] : insight and judgment were intact

## 2022-03-01 NOTE — HISTORY OF PRESENT ILLNESS
[de-identified] : Jyothi is a 82 year female here today for DIANNA. Patient's mood is good and overall health seems to be well. Patient denies heart palpitations, muscle spasms, cramping, fatigue and SOB. Patient stated she has no trouble sleeping. Jyothi reported having acid reflux symptoms and RT shoulder pain two nights ago. Pt stated she took two Tums OTC before going to bed and woke up feeling normal. Pt stated these symptoms are not consistent and she feels fine today. Patient noted her mother had stated she had acid reflux before suffering a heart attack. Patient last saw cardio in 2015 before a knee procedure. Patient reported last week she had finished steroids prescribed to her. Patient reported her hearing has not improved. Patient will be fu with ENT for new hearing aid. Patient confirmed she is compliantly taking all prescribed medications. \par \par Pt reported she will be going to [place] on March 16, 2022 for her son's engagement.

## 2022-03-03 ENCOUNTER — RX RENEWAL (OUTPATIENT)
Age: 82
End: 2022-03-03

## 2022-03-03 LAB
25(OH)D3 SERPL-MCNC: 41.1 NG/ML
ALBUMIN SERPL ELPH-MCNC: 3.9 G/DL
ALP BLD-CCNC: 105 U/L
ALT SERPL-CCNC: 12 U/L
ANION GAP SERPL CALC-SCNC: 12 MMOL/L
AST SERPL-CCNC: 15 U/L
BASOPHILS # BLD AUTO: 0.04 K/UL
BASOPHILS NFR BLD AUTO: 0.4 %
BILIRUB SERPL-MCNC: 0.4 MG/DL
BUN SERPL-MCNC: 24 MG/DL
CALCIUM SERPL-MCNC: 9.4 MG/DL
CHLORIDE SERPL-SCNC: 109 MMOL/L
CHOLEST SERPL-MCNC: 240 MG/DL
CO2 SERPL-SCNC: 20 MMOL/L
CREAT SERPL-MCNC: 1.74 MG/DL
EGFR: 29 ML/MIN/1.73M2
EOSINOPHIL # BLD AUTO: 0.32 K/UL
EOSINOPHIL NFR BLD AUTO: 3.6 %
ESTIMATED AVERAGE GLUCOSE: 134 MG/DL
GLUCOSE SERPL-MCNC: 101 MG/DL
HBA1C MFR BLD HPLC: 6.3 %
HCT VFR BLD CALC: 42.4 %
HDLC SERPL-MCNC: 51 MG/DL
HGB BLD-MCNC: 12.7 G/DL
IMM GRANULOCYTES NFR BLD AUTO: 0.2 %
LDLC SERPL CALC-MCNC: 150 MG/DL
LYMPHOCYTES # BLD AUTO: 1.94 K/UL
LYMPHOCYTES NFR BLD AUTO: 21.8 %
MAN DIFF?: NORMAL
MCHC RBC-ENTMCNC: 28.3 PG
MCHC RBC-ENTMCNC: 30 GM/DL
MCV RBC AUTO: 94.4 FL
MONOCYTES # BLD AUTO: 0.91 K/UL
MONOCYTES NFR BLD AUTO: 10.2 %
NEUTROPHILS # BLD AUTO: 5.67 K/UL
NEUTROPHILS NFR BLD AUTO: 63.8 %
NONHDLC SERPL-MCNC: 189 MG/DL
PLATELET # BLD AUTO: 217 K/UL
POTASSIUM SERPL-SCNC: 4.5 MMOL/L
PROT SERPL-MCNC: 6.5 G/DL
RBC # BLD: 4.49 M/UL
RBC # FLD: 15.9 %
SODIUM SERPL-SCNC: 141 MMOL/L
TRIGL SERPL-MCNC: 195 MG/DL
TSH SERPL-ACNC: 1.85 UIU/ML
URATE SERPL-MCNC: 6.3 MG/DL
WBC # FLD AUTO: 8.9 K/UL

## 2022-03-07 ENCOUNTER — RX RENEWAL (OUTPATIENT)
Age: 82
End: 2022-03-07

## 2022-03-08 ENCOUNTER — APPOINTMENT (OUTPATIENT)
Dept: FAMILY MEDICINE | Facility: CLINIC | Age: 82
End: 2022-03-08
Payer: MEDICARE

## 2022-03-08 VITALS
BODY MASS INDEX: 30.02 KG/M2 | TEMPERATURE: 97.7 F | DIASTOLIC BLOOD PRESSURE: 82 MMHG | OXYGEN SATURATION: 98 % | SYSTOLIC BLOOD PRESSURE: 140 MMHG | HEART RATE: 75 BPM | WEIGHT: 159 LBS | HEIGHT: 61 IN

## 2022-03-08 PROCEDURE — 99213 OFFICE O/P EST LOW 20 MIN: CPT

## 2022-03-08 RX ORDER — PREDNISONE 20 MG/1
20 TABLET ORAL DAILY
Qty: 7 | Refills: 0 | Status: COMPLETED | COMMUNITY
Start: 2022-02-10 | End: 2022-03-08

## 2022-03-09 NOTE — HISTORY OF PRESENT ILLNESS
[FreeTextEntry1] : Follow up Afib [de-identified] : Jyothi is a 82 year female here today for f/u regarding afib. Pt mood is good and overall health seems to be well. Pt denied acute complaints today. Pt expressed concern over not being able to be present for family engagement party in [place] due to stress test scheduled. Pt reported she wants to be gone by March 16th. Pt reported she plans on seeing cardio Dr. Boone tomorrow. Pt stated she scheduled an appt with cardio Dr. Herrera before her trip. Pt denied needing a renew on Metoprolol Succinate ER. Pt stated she does not feel any different on medications prescribed and still feels SOB when using stairs. Pt reported she felt SOB on the way to today's visit from her car. Pt reported she has not fallen though she may feel off-balance sometimes. Pt stated she does not work on her balance. \par \par Pt inquired about how long she will have to stay on blood thinners.

## 2022-03-09 NOTE — END OF VISIT
[FreeTextEntry3] : Medical record entries made by the scribe today today, were at my direction and personally dictated to them by me, Dr. Olive Hughes on March 8, 2022. I have reviewed the chart and agree that the record accurately reflects my personal performance of the history, physical exam, assessment, and plan.\par

## 2022-03-09 NOTE — PLAN
[FreeTextEntry1] : - Rec keeping cell phone on self for emergency purposes \par - Enc to engage in balance exercises \par - Adv consulting with Dr. Boone about future appts. and trip\par - F/u in 4 months for afib checkup

## 2022-04-12 ENCOUNTER — RX RENEWAL (OUTPATIENT)
Age: 82
End: 2022-04-12

## 2022-04-18 ENCOUNTER — RX RENEWAL (OUTPATIENT)
Age: 82
End: 2022-04-18

## 2022-04-29 ENCOUNTER — RX RENEWAL (OUTPATIENT)
Age: 82
End: 2022-04-29

## 2022-05-02 ENCOUNTER — RX RENEWAL (OUTPATIENT)
Age: 82
End: 2022-05-02

## 2022-05-02 RX ORDER — ERGOCALCIFEROL 1.25 MG/1
1.25 MG CAPSULE, LIQUID FILLED ORAL
Qty: 12 | Refills: 0 | Status: ACTIVE | COMMUNITY
Start: 2018-11-29 | End: 1900-01-01

## 2022-10-27 ENCOUNTER — APPOINTMENT (OUTPATIENT)
Dept: PHYSICAL MEDICINE AND REHAB | Facility: CLINIC | Age: 82
End: 2022-10-27

## 2022-10-27 VITALS
HEIGHT: 61 IN | WEIGHT: 159 LBS | DIASTOLIC BLOOD PRESSURE: 99 MMHG | HEART RATE: 59 BPM | SYSTOLIC BLOOD PRESSURE: 177 MMHG | BODY MASS INDEX: 30.02 KG/M2

## 2022-10-27 PROCEDURE — 99214 OFFICE O/P EST MOD 30 MIN: CPT

## 2022-10-27 NOTE — ASSESSMENT
[FreeTextEntry1] : Ms. ADILSON GARNER is a 82 year old female who presents with left leg pain with evidence of left L5 radiculopathy on EMG and foraminal stenosis on MRI. Patient s/p AMMON x 2 with good reduction in her radicular symptoms but now with recurrence of her pain. Last AMMON was over a year ago, 8/5/21. She also has mild lateral L hip pain, likely due to underlying GTB.  Denies any bowel/bladder issues. Will recommend:\par - Discussed with patient the risks (including but not limited to bleeding, infection, nerve damage, etc), benefits and alternatives to an epidural steroid injection for which patient understands. Will plan on repeat left L5-S1 TFESI. Patient says she has a contrast allergy but it was from a reaction 30 years ago for which she just felt a little "tickle" in her throat that resolved spontaneously. Denies any other symptoms. Will have patient pre-medicated for procedure so she can receive the contrast. R/B/A to this was explained to patient for which she agreed. She will also need to be off Eliquis for 3 days if approved by her cardiologist (along with obtaining cardiac clearance).\par - Given new procedure being planned, will obtain new MRI L Spine given last MRI was 1.5 years ago. \par - Advised patient to try Tylenol, up to 3g/day for which she agreed. \par - Given current renal function, advised patient not to take the Gabapentin 100mg that she has at home if she can tolerate it. \par - BP elevated today, patient asymptomatic, she will follow up with PCP. \par \par \par Return for procedure.  Patient aware of red flag signs including any changes to their bowel/bladder control, groin numbness or new weakness. Patient knows to seek immediate attention by calling 911 or going to nearest ER if these symptoms appear. \par \par \par \par

## 2022-10-27 NOTE — HISTORY OF PRESENT ILLNESS
[FreeTextEntry1] : Ms. ADILSON GARNER is a 82 year old  female who presents for follow up. At last visit in 9/2021, she was given a L GTB steroid injection. Since then she has been feeling well until recently. \par \par Location:left lower back, minimally over L hip\par Onset:Chronic, on and off for a few years, but in mid 10/2022, started after cleaning the bathroom\par Provocation/Palliative:Worse with movement and standing, improved with gabapentin\par Quality:Sharp pain\par Radiation:Down LLE in L5 distribution\par Severity:moderate now\par \par No bowel/bladder changes. No groin numbness.

## 2022-10-27 NOTE — PHYSICAL EXAM
[FreeTextEntry1] : PE:\par Constitutional: In NAD, calm and cooperative\par MSK:\par 	Inspection: no gross swelling identifie\par 	Palpation:  Mild TTP of the left lower lumbar paraspinals, Mild TTP over left GTB\par 	Strength: 5/5 strength in bilateral lower extremities\par 	Reflexes: 1+ Patella reflex bilaterally, 1+ Achilles reflex bilaterally, negative clonus bilaterally\par 	Sensation: Intact to light touch in bilateral lower extremities\par Special tests:\par MARIZA: positive on left\par FADIR: negative on left\par SLR: positive on the left

## 2022-11-02 ENCOUNTER — APPOINTMENT (OUTPATIENT)
Dept: PHYSICAL MEDICINE AND REHAB | Facility: CLINIC | Age: 82
End: 2022-11-02

## 2022-11-02 ENCOUNTER — NON-APPOINTMENT (OUTPATIENT)
Age: 82
End: 2022-11-02

## 2022-11-02 VITALS
TEMPERATURE: 96.5 F | DIASTOLIC BLOOD PRESSURE: 81 MMHG | OXYGEN SATURATION: 97 % | SYSTOLIC BLOOD PRESSURE: 169 MMHG | HEART RATE: 86 BPM

## 2022-11-02 PROCEDURE — 99214 OFFICE O/P EST MOD 30 MIN: CPT

## 2022-11-02 NOTE — ASSESSMENT
[FreeTextEntry1] : 82-year-old female with chronic longstanding back pain due to lumbar radiculopathy here with acute on chronic exacerbations with a radicular component to the left side.  There are no myelopathic signs on exam today.  Patient had previously had successful treatment with lumbar epidural steroid injections and wishes to repeat one again.\par \par Patient reassured and educated on the diagnosis and treatment options.\par \par Lumbar MRI reviewed with patient and daughter in the room today\par \par Dr. Manzanares's clinical note from October 27, 2022 reviewed\par \par Dr. Walker orthospine note from July 13, 2021 reviewed today\par Patient does not wish to have any surgery for pain at this time cold\par \par Patient is taking blood thinners at this time. Risks and benefits of having injection while on blood thinners was explained to the patient. Risks discussed included, but not limited to: pain, infection, headaches, bleeding, paralysis and damage to vital organs requiring emergency surgery. Patient will need clearance from PCP/internist to hold blood thinner for 3-7 days (depending on the blood thinner) prior to coming in for the procedure.\par \par Called [facility] and spoke with nurse practitioner Layla.  Patient is cleared to hold Eliquis for 3 days prior to his procedure.  Fax is incoming.\par \par Reached out to Dr. Manzanares and coordinated care\par Patient should follow up with Dr. Manzanares after for her other pains\par \par Patient had tried and failed conservative treatment. This includes but is not limited to: Acetaminophen, NSAIDs, muscle relaxants, neuropathic medications, physician directed home exercises and/or physical therapy for at least 8 weeks. After a thorough discussion of risks and benefits patient would like to proceed with left L4-5 and L5-S1 transforaminal epidural steroid injection with fluoroscopic guidance. \par \par Based on the history, physical exam and diagnostic findings, patient will benefit from this procedure. The goal of this procedure is to reduce pain and inflammation, improve function and range of motion and to further promote increased activity and return to previous level of functioning. The ultimate goal of performing this procedure is to improve patient's quality of life.\par \par Asking for authorization for left L4-5 and L5-S1 transforaminal epidural steroid injection with fluoroscopic guidance to help treat patient´s pain.  Patient will be scheduled for this procedure.\par

## 2022-11-02 NOTE — HISTORY OF PRESENT ILLNESS
[FreeTextEntry1] : ADILSON GARNER is an 82 year old F here with her daughter for initial evaluation of chronic back pain shooting down the left leg. Patient is under the care of Dr. Manzanares and previously received LEFT L5S1 transforaminal epidural steroid injection on 8/5/2021 which fanny for over 1 year. However she present with acute on chronic exacerbation today. She was going to have injection with Dr. Manzanares but comes in today noting that she would not be able to have it until december and she is looking to get it done as soon as possible. Patient is on Eliquis from Dr. Naveed Hidalgo at [facility] due to Atrial fibrilation. She reports that she has letter stating she can hold it for the required 3 days prior to procedures.\par \par Pain is an 8/10 in severity on the left side and shooting down the left leg to the calf and foot. Pain is associate it tingling but denies any numbness, weakness at this time.\par \par \par Denies bowel/bladder dysfunction, saddle anesthesia, fevers, chills, weight loss, night pain, or night sweats at this time.\par \par The pain interferes with function, ADLs and quality of life.\par Patient had tried Acetaminophen, NSAIDs, prescription pain medications, muscle relaxants without any lasting relief of pain.\par \par Patient had imaging studies to evaluate the pain. \par Patient had nerve conduction studies to evaluate the symptoms. \par Patient had tried physical therapy, exercises, stretching, lifestyle modification for over 8 weeks in the past without any significant relief.\par

## 2022-11-02 NOTE — PHYSICAL EXAM
[FreeTextEntry1] : General exam \par \par Constitutional: The patient appears well-developed, well-nourished, and in no apparent distress. Patient is well-groomed.  \par \par Skin: The skin is warm and dry, with normal turgor.  No rashes or lesions are noted.  \par \par Eyes: PERRL.  \par \par ENMT: Ears: Hearing is grossly within normal limits.  \par \par Neck: Supple: The neck is supple.  \par \par Respiratory: Inspection: Breathing unlabored.  \par \par Neurologic: Alert and oriented x 3. \par \par Psychiatric: Patient is cooperative and appropriate.  Mood and affect are normal.  Patient's insight is good, and memory and judgment are intact.\par \par LUMBAR EXAM\par \par APPEARANCE:\par No visible scars, moles or discolorations\par No gross deformity or malalignment\par No erythema, swelling or ecchymosis\par Decreased lumbar lordosis\par \par TENDERNESS:\par +trigger points over bilateral lumbar paraspinal muscles\par Absent over midline spinous processes \par +over left lumbar paraspinal muscles: erector spinae and quadratus lumborum\par Absent over right lumbar paraspinal muscles: erector spinae and quadratus lumborum\par Absent over left sacroiliac joint\par Absent over right sacroiliac joint\par \par ROM:\par Pain limited AROM of the lumbar spine\par Pain limited PROM of the lumbar spine\par + pain with flexion, extension of the lumbar spine\par + pain with left side bending\par + pain with right side bending\par + pain with left rotation\par + pain with right rotation\par +hamstring tightness on the left\par + hamstring tightness on the right\par \par SPECIAL TESTS:\par +left straight leg raising test\par Normal right straight leg raising test\par FABERE left normal\par FABERE right normal\par FADIR left normal\par FADIR right normal\par \par SENSORY TESTING:\par Intact to light touch Left L1-S2\par Intact to light touch Right L1-S2\par \par MOTOR TESTING:\par Muscle tone of the left lower extremity is normal\par Muscle tone of the right lower extremity is normal\par \par Left hip flexion strength is 4/5\par Right hip flexion strength is 5/5\par \par Left quadriceps strength is 4/5\par Right quadriceps strength is 5/5\par \par Left hamstrings strength is 4/5\par Right hamstrings strength is 5/5\par \par Left ankle dorsiflexion strength is 5/5\par Right ankle dorsiflexion strength is 5/5\par \par Left ankle plantar flexion strength is 5/5\par Right ankle plantar flexion strength is 5/5\par \par REFLEXES:\par Patella (L4) left 1+\par Patella (L4) right 1+\par \par GAIT:\par +antalgic gait with straight cane\par Balance fair with ambulation

## 2022-11-02 NOTE — REVIEW OF SYSTEMS
[Chest Pain] : no chest pain [Shortness Of Breath] : no shortness of breath [Abdominal Pain] : no abdominal pain [Negative] : Genitourinary [FreeTextEntry5] : hx of afib [FreeTextEntry9] : back pain [de-identified] : balance issues but denies any falling

## 2022-11-07 LAB — SARS-COV-2 N GENE NPH QL NAA+PROBE: NOT DETECTED

## 2022-11-08 ENCOUNTER — OUTPATIENT (OUTPATIENT)
Dept: OUTPATIENT SERVICES | Facility: HOSPITAL | Age: 82
LOS: 1 days | End: 2022-11-08
Payer: MEDICARE

## 2022-11-08 ENCOUNTER — APPOINTMENT (OUTPATIENT)
Dept: PHYSICAL MEDICINE AND REHAB | Facility: CLINIC | Age: 82
End: 2022-11-08

## 2022-11-08 DIAGNOSIS — M54.16 RADICULOPATHY, LUMBAR REGION: ICD-10-CM

## 2022-11-08 PROCEDURE — 64484 NJX AA&/STRD TFRM EPI L/S EA: CPT

## 2022-11-08 PROCEDURE — 64483 NJX AA&/STRD TFRM EPI L/S 1: CPT

## 2022-12-01 ENCOUNTER — APPOINTMENT (OUTPATIENT)
Dept: PHYSICAL MEDICINE AND REHAB | Facility: AMBULATORY SURGERY CENTER | Age: 82
End: 2022-12-01

## 2022-12-13 ENCOUNTER — APPOINTMENT (OUTPATIENT)
Dept: PHYSICAL MEDICINE AND REHAB | Facility: CLINIC | Age: 82
End: 2022-12-13

## 2022-12-13 VITALS — HEART RATE: 59 BPM | SYSTOLIC BLOOD PRESSURE: 180 MMHG | OXYGEN SATURATION: 97 % | DIASTOLIC BLOOD PRESSURE: 80 MMHG

## 2022-12-13 DIAGNOSIS — R26.89 OTHER ABNORMALITIES OF GAIT AND MOBILITY: ICD-10-CM

## 2022-12-13 PROCEDURE — 99214 OFFICE O/P EST MOD 30 MIN: CPT

## 2022-12-13 NOTE — HISTORY OF PRESENT ILLNESS
[FreeTextEntry1] : ADILSON GARNER had left L4-5 and L5-S1 transforaminal epidural steroid injection with fluoroscopic guidance on 11/8/22. Today patient is here with her daughter for follow up. Patient reports greater than 60% reduction in pain as tested by the NRS pain scale. Patient also reports improvement in quality of life. However she is still c/o balance issues and limping on her left leg. She is concerned about this and has to use the cane. Denies any falling.\par \par Denies any new pain, numbness or weakness, bowel/bladder dysfunction, saddle anesthesia, fevers, chills, weight loss, night pain, or night sweats at this time.\par

## 2022-12-13 NOTE — ASSESSMENT
[FreeTextEntry1] : 82-year-old female with chronic longstanding back pain due to lumbar radiculopathy here with acute on chronic exacerbations with a radicular component to the left side. There are no myelopathic signs on exam today. Patient had excellent pain relief but still notes gait issues on the left side.\par \par Patient reassured and educated on the diagnosis and treatment options.\par Lumbar MRI reviewed with patient and daughter in the room today\par \par Dr. Manzanares's clinical note from October 27, 2022 reviewed\par Patient should follow up with Dr. Manzanares after for her other pains\par \par Sending patient for PT for lumbar radiculopathy, gait imbalance to help relieve pain and improve function. Stretching, strengthening, ROM, home education and other appropriate interventions. Precautions include fall prevention.\par \par Follow up 6 weeks - if still c/o gait issues will sent for spinal surgery consultation\par \par Patient was advised if the following symptoms develop: chills, fever, loss of bladder control, bowel incontinence or urinary retention, numbness/tingling or weakness is present in upper or lower extremities, to go to the nearest emergency room. This may be a new clinical condition not present at the time of the patient visit that may lead to paralysis and/or death. Patient advised if the above symptoms developed to also call the office immediately to inform us and to go to the nearest emergency room.\par \par This note was generated using Dragon medical dictation software. A reasonable effort had been made for proofreading its contents, but spelling mistakes or grammatical errors may still remain. If there are any questions or points of clarification needed please notify my office.\par

## 2022-12-13 NOTE — REVIEW OF SYSTEMS
[Chest Pain] : no chest pain [Shortness Of Breath] : no shortness of breath [Dysuria] : no dysuria [Difficulty Walking] : difficulty walking [Negative] : Constitutional [FreeTextEntry9] : back pain [de-identified] : b

## 2022-12-13 NOTE — PHYSICAL EXAM
[FreeTextEntry1] : General exam \par \par Constitutional: The patient appears well-developed, well-nourished, and in no apparent distress. Patient is well-groomed.  \par \par Skin: The skin is warm and dry, with normal turgor.  No rashes or lesions are noted.  \par \par Eyes: PERRL.  \par \par ENMT: Ears: Hearing is grossly within normal limits.  \par \par Neck: Supple: The neck is supple.  \par \par Respiratory: Inspection: Breathing unlabored.  \par \par Neurologic: Alert and oriented x 3. \par \par Psychiatric: Patient is cooperative and appropriate.  Mood and affect are normal.  Patient's insight is good, and memory and judgment are intact.\par \par LUMBAR\par Skin c/d/i without any erythema, swelling, effusion or tenderness\par FABERE/FADIR negative b/l\par +antalgic gait with SC with decreased standing component on the left

## 2022-12-15 ENCOUNTER — APPOINTMENT (OUTPATIENT)
Dept: PHYSICAL MEDICINE AND REHAB | Facility: CLINIC | Age: 82
End: 2022-12-15

## 2022-12-15 VITALS
SYSTOLIC BLOOD PRESSURE: 164 MMHG | WEIGHT: 153 LBS | BODY MASS INDEX: 28.89 KG/M2 | RESPIRATION RATE: 14 BRPM | DIASTOLIC BLOOD PRESSURE: 83 MMHG | HEIGHT: 61 IN | HEART RATE: 64 BPM

## 2022-12-15 PROCEDURE — 99213 OFFICE O/P EST LOW 20 MIN: CPT

## 2022-12-15 NOTE — PHYSICAL EXAM
Balloon inserted to left anterior descending and middle left anterior descending. [FreeTextEntry1] : PE:\par Constitutional: In NAD, calm and cooperative\par MSK:\par 	Inspection: no gross swelling identified\par 	Palpation:  Mild TTP of the left lower lumbar paraspinals and over L PSIS\par 	Strength: 5/5 strength in bilateral lower extremities\par 	Reflexes: 1+ Patella reflex bilaterally, 1+ Achilles reflex bilaterally, negative clonus bilaterally\par 	Sensation: Intact to light touch in bilateral lower extremities\par Special tests:\par MARIZA: positive on left\par FADIR: negative on left\par SLR: negative on the left\par Yoeman's: positive on the left\par Gaenslen's: positive on the left

## 2022-12-15 NOTE — ASSESSMENT
[FreeTextEntry1] : Ms. ADILSON GARNER is a 82 year old female who presents with left low back pain. She is s/p recent AMMON with relief of LLE pain but with persistent L buttock pain. Pain may be due to underlying spondylosis but also possible sacroiliac joint related pain.  Denies any bowel/bladder issues. Will recommend:\par - Given persistent low back pain, will order MRI L Spine\par - Advised patient to try Tylenol, up to 3g/day for which she agreed. \par - Patient to try Lidocaine patches, 12 hours on/off\par - BP again elevated today, patient asymptomatic, she will follow up with PCP. \par \par \par Return after MRI.  Patient aware of red flag signs including any changes to their bowel/bladder control, groin numbness or new weakness. Patient knows to seek immediate attention by calling 911 or going to nearest ER if these symptoms appear. \par \par \par \par

## 2022-12-19 ENCOUNTER — OUTPATIENT (OUTPATIENT)
Dept: OUTPATIENT SERVICES | Facility: HOSPITAL | Age: 82
LOS: 1 days | End: 2022-12-19
Payer: MEDICARE

## 2022-12-19 ENCOUNTER — APPOINTMENT (OUTPATIENT)
Dept: MRI IMAGING | Facility: CLINIC | Age: 82
End: 2022-12-19

## 2022-12-19 DIAGNOSIS — M48.061 SPINAL STENOSIS, LUMBAR REGION WITHOUT NEUROGENIC CLAUDICATION: ICD-10-CM

## 2022-12-19 DIAGNOSIS — M47.816 SPONDYLOSIS WITHOUT MYELOPATHY OR RADICULOPATHY, LUMBAR REGION: ICD-10-CM

## 2022-12-19 PROCEDURE — 72148 MRI LUMBAR SPINE W/O DYE: CPT | Mod: 26,MH

## 2022-12-19 PROCEDURE — 72148 MRI LUMBAR SPINE W/O DYE: CPT

## 2022-12-27 ENCOUNTER — NON-APPOINTMENT (OUTPATIENT)
Age: 82
End: 2022-12-27

## 2023-01-10 ENCOUNTER — NON-APPOINTMENT (OUTPATIENT)
Age: 83
End: 2023-01-10

## 2023-01-18 ENCOUNTER — NON-APPOINTMENT (OUTPATIENT)
Age: 83
End: 2023-01-18

## 2023-01-19 ENCOUNTER — APPOINTMENT (OUTPATIENT)
Dept: FAMILY MEDICINE | Facility: CLINIC | Age: 83
End: 2023-01-19
Payer: MEDICARE

## 2023-01-19 VITALS
TEMPERATURE: 97.2 F | WEIGHT: 153 LBS | HEIGHT: 61 IN | BODY MASS INDEX: 28.89 KG/M2 | SYSTOLIC BLOOD PRESSURE: 152 MMHG | OXYGEN SATURATION: 98 % | HEART RATE: 62 BPM | DIASTOLIC BLOOD PRESSURE: 82 MMHG

## 2023-01-19 DIAGNOSIS — Z09 ENCOUNTER FOR FOLLOW-UP EXAMINATION AFTER COMPLETED TREATMENT FOR CONDITIONS OTHER THAN MALIGNANT NEOPLASM: ICD-10-CM

## 2023-01-19 PROCEDURE — 99214 OFFICE O/P EST MOD 30 MIN: CPT

## 2023-01-20 ENCOUNTER — APPOINTMENT (OUTPATIENT)
Age: 83
End: 2023-01-20

## 2023-01-20 PROBLEM — Z09 HOSPITAL DISCHARGE FOLLOW-UP: Status: ACTIVE | Noted: 2023-01-20

## 2023-01-20 LAB
ALBUMIN SERPL ELPH-MCNC: 4.3 G/DL
ALP BLD-CCNC: 102 U/L
ALT SERPL-CCNC: 25 U/L
ANION GAP SERPL CALC-SCNC: 13 MMOL/L
AST SERPL-CCNC: 30 U/L
BASOPHILS # BLD AUTO: 0.07 K/UL
BASOPHILS NFR BLD AUTO: 1.3 %
BILIRUB SERPL-MCNC: 0.3 MG/DL
BUN SERPL-MCNC: 12 MG/DL
CALCIUM SERPL-MCNC: 11.1 MG/DL
CHLORIDE SERPL-SCNC: 109 MMOL/L
CO2 SERPL-SCNC: 22 MMOL/L
CREAT SERPL-MCNC: 1.96 MG/DL
EGFR: 25 ML/MIN/1.73M2
EOSINOPHIL # BLD AUTO: 0.46 K/UL
EOSINOPHIL NFR BLD AUTO: 8.7 %
GLUCOSE SERPL-MCNC: 96 MG/DL
HCT VFR BLD CALC: 40.6 %
HGB BLD-MCNC: 12.5 G/DL
IMM GRANULOCYTES NFR BLD AUTO: 0.2 %
LYMPHOCYTES # BLD AUTO: 1.39 K/UL
LYMPHOCYTES NFR BLD AUTO: 26.2 %
MAN DIFF?: NORMAL
MCHC RBC-ENTMCNC: 28.2 PG
MCHC RBC-ENTMCNC: 30.8 GM/DL
MCV RBC AUTO: 91.4 FL
MONOCYTES # BLD AUTO: 0.51 K/UL
MONOCYTES NFR BLD AUTO: 9.6 %
NEUTROPHILS # BLD AUTO: 2.86 K/UL
NEUTROPHILS NFR BLD AUTO: 54 %
PLATELET # BLD AUTO: 224 K/UL
POTASSIUM SERPL-SCNC: 5.2 MMOL/L
PROT SERPL-MCNC: 7.1 G/DL
RBC # BLD: 4.44 M/UL
RBC # FLD: 15 %
SODIUM SERPL-SCNC: 143 MMOL/L
WBC # FLD AUTO: 5.3 K/UL

## 2023-01-20 NOTE — PLAN
[FreeTextEntry1] : Hospital course, labs, and imaging reviewed\par Discussed changes to medication and reviewed medication list \par BW drawn today \par ENcourage hydration\par Encourage F/U nephrology, cardiology and neurology \par CT scan must be done 1 month after discharge as per discharge notes \par f/u 1 month or sooner if needed

## 2023-01-20 NOTE — PHYSICAL EXAM
[Normal Outer Ear/Nose] : the outer ears and nose were normal in appearance [Normal Oropharynx] : the oropharynx was normal [Normal TMs] : both tympanic membranes were normal [Normal] : normal rate, regular rhythm, normal S1 and S2 and no murmur heard [Pedal Pulses Present] : the pedal pulses are present [No Edema] : there was no peripheral edema [Normal Affect] : the affect was normal [Normal Insight/Judgement] : insight and judgment were intact [de-identified] : no facial droop

## 2023-01-24 ENCOUNTER — APPOINTMENT (OUTPATIENT)
Dept: FAMILY MEDICINE | Facility: CLINIC | Age: 83
End: 2023-01-24
Payer: MEDICARE

## 2023-01-24 ENCOUNTER — APPOINTMENT (OUTPATIENT)
Dept: FAMILY MEDICINE | Facility: CLINIC | Age: 83
End: 2023-01-24

## 2023-01-24 VITALS
TEMPERATURE: 98.2 F | DIASTOLIC BLOOD PRESSURE: 78 MMHG | WEIGHT: 153 LBS | HEART RATE: 60 BPM | HEIGHT: 61 IN | SYSTOLIC BLOOD PRESSURE: 138 MMHG | BODY MASS INDEX: 28.89 KG/M2 | OXYGEN SATURATION: 98 %

## 2023-01-24 DIAGNOSIS — Z86.79 PERSONAL HISTORY OF OTHER DISEASES OF THE CIRCULATORY SYSTEM: ICD-10-CM

## 2023-01-24 DIAGNOSIS — E83.52 HYPERCALCEMIA: ICD-10-CM

## 2023-01-24 DIAGNOSIS — R73.9 HYPERGLYCEMIA, UNSPECIFIED: ICD-10-CM

## 2023-01-24 PROCEDURE — 99214 OFFICE O/P EST MOD 30 MIN: CPT

## 2023-01-24 RX ORDER — GABAPENTIN 100 MG/1
100 CAPSULE ORAL
Qty: 90 | Refills: 0 | Status: COMPLETED | COMMUNITY
Start: 2021-04-28 | End: 2023-01-24

## 2023-01-24 RX ORDER — OMEPRAZOLE 40 MG/1
40 CAPSULE, DELAYED RELEASE ORAL
Qty: 90 | Refills: 0 | Status: ACTIVE | COMMUNITY
Start: 2022-01-14 | End: 1900-01-01

## 2023-01-24 NOTE — ASSESSMENT
[FreeTextEntry1] : # HTN\par - Started on Nifedipine ER 90 MG \par \par # Atrial Flutter\par - Started on Metoprolol Succinate  MG \par \par # HLD\par - Started on Atorvastatin Calcium 40 MG \par \par # GERD\par - Renewal on Omeprazole 40 MG \par \par # Hypercalcemia\par - Labs drawn in office today\par

## 2023-01-24 NOTE — END OF VISIT
[FreeTextEntry3] : Medical record entries made by the scribe today today, were at my direction and personally dictated to them by me, Dr. Olive Hughes on January 24, 2023. I have reviewed the chart and agree that the record accurately reflects my personal performance of the history, physical exam, assessment, and plan.\par

## 2023-01-24 NOTE — HISTORY OF PRESENT ILLNESS
[FreeTextEntry1] : follow up on elevated calcium  [de-identified] : Jyothi is a 83 year female here today for F/u on elevated calcium. She denied any acute complaints today. Pt was accompanied by her daughter today. Her daughter stated the patient took some TUMs OTC after eating some pizza recently. She noted she was experiencing \par Pt is now in Nifedipine, Metoprolol Succinate 100 MG, and Atorvastatin Calcium 40 MG. She is no longer taking Omeprazole, Gabapentin 100 MG, or Potassium Chloride ER 20 MG or Furosemide 20 MG. She noted she feels like her heartburn is exacerbated if she drinks too much water.\par \par Pt will be following up with opthalmology today. She will be following up with nephrology. She saw cardio yesterday and will be returning for loop monitor placement.

## 2023-01-24 NOTE — PLAN
[FreeTextEntry1] : - Labs drawn in office today\par - RENEW: Omeprazole 40 MG Oral Capsule Delayed Release; Take 1 Capsule by Mouth Daily \par - START: Nifedipine ER 90 MG Oral Tablet Extended Release 24 Hour; Take 1 Tablet Once Daily \par - START:Metoprolol Succinate  MG Oral Tablet Extended Release 24 HOur; Take 1 Tablet Daily \par - START: Atorvastatin Calcium 40 MG Oral Tablet; Take 1 Tablet Daily as Directed\par - Adv to take Omeprazole 40 MG instead of TUMs as needed\par - F/u in April 2023 or sooner as needed

## 2023-01-25 LAB
ALBUMIN SERPL ELPH-MCNC: 3.8 G/DL
ALP BLD-CCNC: 91 U/L
ALT SERPL-CCNC: 16 U/L
ANION GAP SERPL CALC-SCNC: 9 MMOL/L
AST SERPL-CCNC: 39 U/L
BASOPHILS # BLD AUTO: 0.1 K/UL
BASOPHILS NFR BLD AUTO: 1.8 %
BILIRUB SERPL-MCNC: 0.4 MG/DL
BUN SERPL-MCNC: 21 MG/DL
CALCIUM SERPL-MCNC: 10.5 MG/DL
CALCIUM SERPL-MCNC: 10.5 MG/DL
CHLORIDE SERPL-SCNC: 109 MMOL/L
CHOLEST SERPL-MCNC: 146 MG/DL
CO2 SERPL-SCNC: 23 MMOL/L
CREAT SERPL-MCNC: 1.85 MG/DL
EGFR: 26 ML/MIN/1.73M2
EOSINOPHIL # BLD AUTO: 0.29 K/UL
EOSINOPHIL NFR BLD AUTO: 5.2 %
GLUCOSE SERPL-MCNC: 117 MG/DL
HCT VFR BLD CALC: 37.7 %
HDLC SERPL-MCNC: 45 MG/DL
HGB BLD-MCNC: 11.4 G/DL
IMM GRANULOCYTES NFR BLD AUTO: 0.2 %
LDLC SERPL CALC-MCNC: 79 MG/DL
LYMPHOCYTES # BLD AUTO: 1.44 K/UL
LYMPHOCYTES NFR BLD AUTO: 25.9 %
MAN DIFF?: NORMAL
MCHC RBC-ENTMCNC: 28.8 PG
MCHC RBC-ENTMCNC: 30.2 GM/DL
MCV RBC AUTO: 95.2 FL
MONOCYTES # BLD AUTO: 0.59 K/UL
MONOCYTES NFR BLD AUTO: 10.6 %
NEUTROPHILS # BLD AUTO: 3.13 K/UL
NEUTROPHILS NFR BLD AUTO: 56.3 %
NONHDLC SERPL-MCNC: 101 MG/DL
PARATHYROID HORMONE INTACT: 54 PG/ML
PLATELET # BLD AUTO: 199 K/UL
POTASSIUM SERPL-SCNC: 6.5 MMOL/L
PROT SERPL-MCNC: 6.9 G/DL
RBC # BLD: 3.96 M/UL
RBC # FLD: 15 %
SODIUM SERPL-SCNC: 141 MMOL/L
TRIGL SERPL-MCNC: 110 MG/DL
WBC # FLD AUTO: 5.56 K/UL

## 2023-01-26 LAB
25(OH)D3 SERPL-MCNC: 60.3 NG/ML
ESTIMATED AVERAGE GLUCOSE: 120 MG/DL
HBA1C MFR BLD HPLC: 5.8 %

## 2023-02-10 ENCOUNTER — TRANSCRIPTION ENCOUNTER (OUTPATIENT)
Age: 83
End: 2023-02-10

## 2023-03-06 ENCOUNTER — APPOINTMENT (OUTPATIENT)
Dept: PHYSICAL MEDICINE AND REHAB | Facility: CLINIC | Age: 83
End: 2023-03-06
Payer: MEDICARE

## 2023-03-06 VITALS
WEIGHT: 153 LBS | BODY MASS INDEX: 28.89 KG/M2 | HEIGHT: 61 IN | DIASTOLIC BLOOD PRESSURE: 74 MMHG | SYSTOLIC BLOOD PRESSURE: 119 MMHG

## 2023-03-06 DIAGNOSIS — M47.816 SPONDYLOSIS W/OUT MYELOPATHY OR RADICULOPATHY, LUMBAR REGION: ICD-10-CM

## 2023-03-06 PROCEDURE — 20550 NJX 1 TENDON SHEATH/LIGAMENT: CPT | Mod: LT

## 2023-03-06 PROCEDURE — 99213 OFFICE O/P EST LOW 20 MIN: CPT | Mod: 25

## 2023-03-06 NOTE — ASSESSMENT
[FreeTextEntry1] : Ms. ADILSON GARNER is a 82 year old female who presents with left low back pain. She is s/p recent AMMON with relief of LLE pain but with persistent L buttock pain. Pain may be due to underlying spondylosis but also possible sacroiliac joint related pain. Her main complaint today is actually L Lateral thigh pain, consistent with IT band syndrome.  Denies any bowel/bladder issues. Will recommend:\par - TPI to L IT band performed today. \par - Continue Tylenol, up to 3g/day for which she agreed. \par - Patient to try Lidocaine patches, 12 hours on/off\par \par \par Return in 3-4 weeks. Patient aware of red flag signs including any changes to their bowel/bladder control, groin numbness or new weakness. Patient knows to seek immediate attention by calling 911 or going to nearest ER if these symptoms appear. \par \par \par \par

## 2023-03-06 NOTE — PHYSICAL EXAM
[FreeTextEntry1] : PE:\par Constitutional: In NAD, calm and cooperative\par MSK:\par 	Inspection: no gross swelling identified\par 	Palpation:  Mild TTP of the left lower lumbar paraspinals and over L PSIS, moderate TTP down L IT band\par 	Strength: 5/5 strength in bilateral lower extremities\par 	Reflexes: 1+ Patella reflex bilaterally, 1+ Achilles reflex bilaterally, negative clonus bilaterally\par 	Sensation: Intact to light touch in bilateral lower extremities\par Special tests:\par MARIZA: positive on left\par FADIR: negative on left\par SLR: negative on the left\par Yoeman's: positive on the left\par Gaenslen's: positive on the left

## 2023-03-06 NOTE — DATA REVIEWED
[FreeTextEntry1] : \par   MR Lumbar Spine No Cont             Final\par \par No Documents Attached\par \par \par \par \par   EXAM: [id number] - MR SPINE LUMBAR  - ORDERED BY: NOEL LINARES\par \par \par PROCEDURE DATE:  12/19/2022\par \par \par \par INTERPRETATION:  CLINICAL INFORMATION: Persistent low back pain. Lower back pain radiating to the left leg.\par \par ADDITIONAL CLINICAL INFORMATION: Low back muscle strain S39.012A\par \par TECHNIQUE: Multiplanar, multisequence MRI was performed of the lumbar spine.\par IV Contrast: NONE\par \par PRIOR STUDIES: Lumbar spine MRI 4/20/2021.\par \par FINDINGS:\par \par LOCALIZER: No additional findings.\par BONES: There is generalized degenerative endplate edema posteriorly and asymmetric to the left at L2/L3 mild degenerative endplate edema asymmetric to the right at L5/S1.\par ALIGNMENT: Dextroconvexity of the lumbar spine. Mild rightward lateral listhesis of L4 and L5. Mild retrolisthesis of L1 on L2. Grade 1 anterolisthesis of L4 on L5 and trace anterolisthesis of L5 on S1. Multilevel loss of intervertebral disc height with endplate osteophyte formation.\par SACROILIAC JOINTS/SACRUM: There is no sacral fracture. The SI joints are partially visualized but are intact.\par CONUS AND CAUDA EQUINA: The distal cord and conus are normal in signal. Conus terminates at L1/L2..\par VISUALIZED INTRAPELVIC/INTRA-ABDOMINAL SOFT TISSUES: Multiple bilateral renal T2 hyperintensities.\par PARASPINAL SOFT TISSUES: Normal.\par \par \par INDIVIDUAL LEVELS:\par \par LOWER THORACIC SPINE: No spinal canal or neuroforaminal stenosis.\par \par L1-L2: Retrolisthesis with disc bulge asymmetric to the left, resulting mild spinal canal narrowing and mild right and mild-to-moderate left foraminal narrowing. No change from prior exam.\par L2-L3: Small disc bulge slightly asymmetric to the left and bilateral facet arthrosis and thickening of the ligamentum flavum, resulting mild spinal canal narrowing, narrowing of the left lateral recess and mild right and moderate left foraminal narrowing. No significant change from prior exam.\par L3-L4: Disc bulge asymmetric to the left and bilateral facet arthrosis and thickening of the ligamentum flavum, resulting mild to moderate spinal canal narrowing, narrowing of the lateral recesses, left greater than right and mild to moderate foraminal narrowing, left greater than right. No significant change from prior exam.\par L4-L5: Disc bulge with superimposed left lateral recess/proximal foraminal protrusion and severe facet arthrosis and thickening of the ligamentum flavum, resulting in mild spinal canal narrowing asymmetric to the left, severe narrowing of the left lateral recess and mild bilateral foraminal narrowing. No significant change from prior exam.\par L5-S1: Disc bulge and foraminal osseous ridging resulting in mild to moderate foraminal narrowing. No spinal canal narrowing. No significant change from prior exam.\par \par \par IMPRESSION:\par \par Lumbar spondylosis, most notably at L3/L4, resulting in mild to moderate spinal canal narrowing and mild to moderate foraminal narrowing, left greater than right, and at L4/L5 with disc bulge with superimposed left lateral recess protrusion resulting mild spinal canal narrowing asymmetric to the left and severe narrowing of the left lateral recess.\par Findings are not significantly changed from prior MRI.\par \par --- End of Report ---\par \par \par \par \par \par \par ENRIQUE GREEN MD; Attending Radiologist\par This document has been electronically signed. Dec 21 2022  3:08PM\par \par  \par \par  Ordered by: NOEL LINARES       Collected/Examined: [id number] 05:51PM       \par Verified by: NOEL LINARES [id number] 04:28PM       \par  Result Communication: No patient communication needed at this time;\par Stage: Final       \par  Performed at: [facility]       Resulted: [id number] 02:56PM       Last Updated: [id number] 04:28PM       Accession: [id number]

## 2023-03-06 NOTE — HISTORY OF PRESENT ILLNESS
[FreeTextEntry1] : Ms. ADILSON GARNER is a 83 year old  female who presents for follow up. At last visit, she was ordered an MRI L Spine, advised patient to stick with Tylenol and Lidocaine patches. 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 No longer on eliquis. \par \par Location:left lower back/buttock, but also along L lateral leg\par Onset:Chronic, on and off for a few years, but in mid 10/2022, started after cleaning the bathroom\par Provocation/Palliative:Worse with movement and standing, improved with gabapentin\par Quality:Sharp pain\par Radiation:No radiation at this time\par Severity:5-6/10\par \par \par No bowel/bladder changes. No groin numbness.

## 2023-03-06 NOTE — PROCEDURE
[de-identified] : Reason for procedure: Myalgia\par \par Procedure: Trigger Point Injections\par Physician: Dr. Manzanares\par Medication injected: Lidocaine 1%, 5cc total\par Sedation medications: None\par Estimated blood loss: None\par Complications: None\par \par Technique: R/B/A to trigger point injection reviewed with patient. The patient is agreeable and wishes to proceed.   The patient was placed in prone position and trigger points of the L IT band were identified. The area was prepped in normal sterile fashion with Chloroprep. A 27 gauge 1.25 inch needle was advanced into the palpable trigger points with reproduction of pain. After negative aspiration of heme, the above medications were injected into the trigger areas. Needle was then removed, bandaid placed over injection sites. There was no complications, the patient was provided with post injection instructions.

## 2023-03-23 ENCOUNTER — APPOINTMENT (OUTPATIENT)
Dept: PHYSICAL MEDICINE AND REHAB | Facility: CLINIC | Age: 83
End: 2023-03-23
Payer: MEDICARE

## 2023-03-23 VITALS — DIASTOLIC BLOOD PRESSURE: 82 MMHG | HEART RATE: 56 BPM | SYSTOLIC BLOOD PRESSURE: 132 MMHG

## 2023-03-23 VITALS
HEIGHT: 61 IN | DIASTOLIC BLOOD PRESSURE: 82 MMHG | WEIGHT: 153 LBS | SYSTOLIC BLOOD PRESSURE: 157 MMHG | BODY MASS INDEX: 28.89 KG/M2

## 2023-03-23 DIAGNOSIS — M53.3 SACROCOCCYGEAL DISORDERS, NOT ELSEWHERE CLASSIFIED: ICD-10-CM

## 2023-03-23 DIAGNOSIS — M76.32 ILIOTIBIAL BAND SYNDROME, LEFT LEG: ICD-10-CM

## 2023-03-23 PROCEDURE — 99213 OFFICE O/P EST LOW 20 MIN: CPT

## 2023-03-23 NOTE — ASSESSMENT
[FreeTextEntry1] : Ms. ADILSON GARNER is a 83 year old female who presents with left low back pain. She is s/p recent AMMON with relief of LLE pain but with persistent L buttock/hip pain. Pain may be due to underlying spondylosis but also possible sacroiliac joint related pain. Her main complaint today is actually L Lateral thigh pain, improved with L IT band TPIs but now more consistent with GTB.  Denies any bowel/bladder issues. Will recommend:\par - Continue Tylenol, up to 3g/day for which she agreed. \par - Patient to again try Lidocaine patches, 12 hours on/off\par - Would hold off on GTB CSI for now\par \par \par Return in 5-6 weeks. Patient aware of red flag signs including any changes to their bowel/bladder control, groin numbness or new weakness. Patient knows to seek immediate attention by calling 911 or going to nearest ER if these symptoms appear. \par \par \par \par

## 2023-03-23 NOTE — PHYSICAL EXAM
[FreeTextEntry1] : PE:\par Constitutional: In NAD, calm and cooperative\par MSK:\par 	Inspection: no gross swelling identified\par 	Palpation:  Mild TTP of the left lower lumbar paraspinals and over L PSIS, Minimal TTP down L IT band, moderate TTP over L GTB\par 	Strength: 5/5 strength in bilateral lower extremities\par 	Reflexes: 1+ Patella reflex bilaterally, 1+ Achilles reflex bilaterally, negative clonus bilaterally\par 	Sensation: Intact to light touch in bilateral lower extremities\par Special tests:\par MARIZA: positive on left\par FADIR: negative on left\par SLR: negative on the left\par Yoeman's: positive on the left\par Gaenslen's: positive on the left

## 2023-03-23 NOTE — HISTORY OF PRESENT ILLNESS
[FreeTextEntry1] : Ms. ADILSON GARNER is a 83 year old  female who presents for follow up. At last visit, she was given TPI, continued on Tylenol and was going to try Lidocaine patches. She said the TPIs did help. Her pain is more in the left lower back/hip now. \par \par Location:left lower back/buttock, but less along L lateral leg\par Onset:Chronic, on and off for a few years, but in mid 10/2022, started after cleaning the bathroom\par Provocation/Palliative:Worse with movement and standing, improved with gabapentin\par Quality:Sharp pain\par Radiation:No radiation at this time\par Severity:mild\par \par No bowel/bladder changes. No groin numbness.

## 2023-03-28 ENCOUNTER — TRANSCRIPTION ENCOUNTER (OUTPATIENT)
Age: 83
End: 2023-03-28

## 2023-03-29 ENCOUNTER — LABORATORY RESULT (OUTPATIENT)
Age: 83
End: 2023-03-29

## 2023-03-29 ENCOUNTER — APPOINTMENT (OUTPATIENT)
Dept: FAMILY MEDICINE | Facility: CLINIC | Age: 83
End: 2023-03-29
Payer: MEDICARE

## 2023-03-29 VITALS
HEIGHT: 61 IN | WEIGHT: 149 LBS | TEMPERATURE: 98.2 F | HEART RATE: 80 BPM | DIASTOLIC BLOOD PRESSURE: 86 MMHG | BODY MASS INDEX: 28.13 KG/M2 | OXYGEN SATURATION: 98 % | SYSTOLIC BLOOD PRESSURE: 160 MMHG

## 2023-03-29 DIAGNOSIS — K59.00 CONSTIPATION, UNSPECIFIED: ICD-10-CM

## 2023-03-29 DIAGNOSIS — R63.0 ANOREXIA: ICD-10-CM

## 2023-03-29 DIAGNOSIS — J34.89 OTHER SPECIFIED DISORDERS OF NOSE AND NASAL SINUSES: ICD-10-CM

## 2023-03-29 PROCEDURE — G0439: CPT

## 2023-03-29 RX ORDER — IPRATROPIUM BROMIDE 21 UG/1
0.03 SPRAY NASAL TWICE DAILY
Qty: 3 | Refills: 3 | Status: ACTIVE | COMMUNITY
Start: 2023-03-29 | End: 1900-01-01

## 2023-03-29 NOTE — PLAN
[FreeTextEntry1] : Rhinorrhea\par - Start using nasal spray.\par \par - Labs drawn in office today.\par - Discussed increased risk of falling and encouraged additional caution. Advised her to use walker if there are issues with the cane. \par - Advised her to monitor BP at home regularly. \par - Discussed importance of regular exercise and balanced diet.\par - Encouraged her to maintain her current weight and avoid losing more.\par - Continue current medications.\par - Follow up in 3 months, sooner if needed.

## 2023-03-29 NOTE — HISTORY OF PRESENT ILLNESS
[FreeTextEntry1] : COLTEN [de-identified] : ADILSON is a 83 year female here today for AWV. Mood is good. She is ambulating with a walker and presents with her daughter today. She fell on her knee last week when she tripped on her cane, but denies injuries. She does her physical therapy 3 times a week and does the exercises everyday at home. She reports right leg swelling after standing for a while, which subsides after she elevates her legs. She states her nose has been running since the stroke. Reports her appetite decreased. Denies GI discomfort, but reports she regularly takes a stool softener for constipation. She reports she lost her sense of smell years ago. She saw an ENT and reports she lost 30% hearing in on of her ears. She sees Dr. Manzanares for left leg pain and lower back pain. She got injections in the past and plans to try lidocaine patches. She saw Dr Resendiz cardiologist and was told she is stable and to follow up in June. She saw her neurologist after the stroke and was told to continue baby aspirin. Her nephrologist referred her to urologist concerning a kidney cyst.

## 2023-03-29 NOTE — REASON FOR VISIT
[Annual Wellness Visit] : an annual wellness visit [FreeTextEntry1] : [facility] wellness exam

## 2023-03-29 NOTE — HEALTH RISK ASSESSMENT
[Good] : ~his/her~  mood as  good [Never (0 pts)] : Never (0 points) [No] : In the past 12 months have you used drugs other than those required for medical reasons? No [0] : 2) Feeling down, depressed, or hopeless: Not at all (0) [PHQ-2 Negative - No further assessment needed] : PHQ-2 Negative - No further assessment needed [None] : None [Feels Safe at Home] : Feels safe at home [Fully functional (bathing, dressing, toileting, transferring, walking, feeding)] : Fully functional (bathing, dressing, toileting, transferring, walking, feeding) [Fully functional (using the telephone, shopping, preparing meals, housekeeping, doing laundry, using] : Fully functional and needs no help or supervision to perform IADLs (using the telephone, shopping, preparing meals, housekeeping, doing laundry, using transportation, managing medications and managing finances) [Never] : Never [One fall no injury in past year] : Patient reported one fall in the past year without injury [Audit-CScore] : 0 [[id number]] : 0

## 2023-03-29 NOTE — END OF VISIT
[FreeTextEntry3] : Medical record entries made by the scribe today 03/29/2023, were at my direction and personally dictated to them by me, Dr. Olive Hughes on 03/29/2023. I have reviewed the chart and agree that the record accurately reflects my personal performance of the history, physical exam, assessment, and plan.

## 2023-04-03 ENCOUNTER — APPOINTMENT (OUTPATIENT)
Age: 83
End: 2023-04-03

## 2023-07-24 ENCOUNTER — APPOINTMENT (OUTPATIENT)
Dept: PHYSICAL MEDICINE AND REHAB | Facility: CLINIC | Age: 83
End: 2023-07-24
Payer: MEDICARE

## 2023-07-24 VITALS
HEART RATE: 64 BPM | DIASTOLIC BLOOD PRESSURE: 82 MMHG | HEIGHT: 61 IN | WEIGHT: 137 LBS | SYSTOLIC BLOOD PRESSURE: 144 MMHG | BODY MASS INDEX: 25.86 KG/M2

## 2023-07-24 DIAGNOSIS — M70.62 TROCHANTERIC BURSITIS, LEFT HIP: ICD-10-CM

## 2023-07-24 DIAGNOSIS — M54.16 RADICULOPATHY, LUMBAR REGION: ICD-10-CM

## 2023-07-24 DIAGNOSIS — M21.372 FOOT DROP, LEFT FOOT: ICD-10-CM

## 2023-07-24 DIAGNOSIS — M48.061 SPINAL STENOSIS, LUMBAR REGION WITHOUT NEUROGENIC CLAUDICATION: ICD-10-CM

## 2023-07-24 PROCEDURE — 99214 OFFICE O/P EST MOD 30 MIN: CPT

## 2023-07-24 NOTE — DATA REVIEWED
[FreeTextEntry1] : MR L Spine [facility] radiology 23 reviewed by me: impingement of the bilateral L4 and L5 nerve roots seen\par \par PATIENT NAME: Jyothi Morrow\par PATIENT PHONE NUMBER: [phone]\par PATIENT ID: [id number]\par : 1940\par DATE OF EXAM: 2023\par R. Phys. Name: Malika Barba\par R. Phys. Address: [address] [place] [address]\par R. Phys. Phone: [phone]\par MRI-LUMBAR SPINE NON CONTRAST\par \par HISTORY: M79.605 Left Leg Pain\par \par COMPARISON: None available.\par \par TECHNIQUE: MRI of the lumbar spine was performed with multiple sequences\par performed in sagittal and axial planes on a 3.0 [place] ultra-high field\par wide-bore magnet.\par \par FINDINGS:\par \par \par \par Vertebral body heights: Old healed superior L3 endplate compression fracture\par \par Alignment: There is a dextroconvex scoliosis, grade 1 retrolisthesis of L1 on L2\par and anterolisthesis of L4 on L5\par \par Marrow signal: No acute fracture or marrow replacing lesion is seen\par \par Spinal canal: The conus terminates at L1 and is unremarkable\par \par Paravertebral soft tissues: Unremarkable.\par \par Discs: There is degenerative disc disease with decreased T2 disc signal from\par L1-L2 through L5-S1. Disc space narrowing and endplate changes are most\par pronounced at L1-L2\par \par L1-2: There is diffuse disc bulging, grade 1 retrolisthesis resulting in mild\par central canal stenosis with impingement of the descending bilateral L2 nerve\par roots and moderate right severe left foraminal narrowing\par \par L2-3: There is diffuse disc bulging, superimposed left paracentral disc\par herniation, and facet arthropathy resulting in impingement of the descending\par left L3 nerve roots with moderate central canal stenosis at moderate foraminal\par narrowing\par \par L3-4: There is diffuse disc bulging, ligamentum flavum thickening and facet\par arthropathy resulting in moderate central canal stenosis with impingement of the\par descending L4 nerve roots and moderate foraminal narrowing\par \par L4-5: There is diffuse is bulging, ligamentum flavum thickening and facet\par arthropathy impinging the descending bilateral L5 nerve roots with moderate\par central canal stenosis and moderate foraminal narrowing\par \par L5-S1: There is diffuse disc bulging, facet arthropathy resulting in moderate\par right and mild left foraminal narrowing. Subarticular stenosis impinges the\par descending right S1 nerve roots\par \par IMPRESSION:\par \par Mild central canal stenosis at L1-L2 with impingement of the descending L2 nerve\par roots and moderate right severe left foraminal narrowing.\par \par Moderate central canal stenosis at L2-3 with impingement of the left L3 nerve\par roots and moderate foraminal narrowing.\par \par Moderate central canal stenosis at L3-4 with impingement of the descending L4\par nerve roots and moderate foraminal narrowing.\par \par Moderate central canal stenosis at L4-5 with impingement of the descending L5\par nerve roots and moderate foraminal narrowing.\par \par Additional milder degenerative changes as above.\par \par ICD 10 -\par \par Signed by: Candace Crowe MD\par Signed Date: 2023 8:41 AM EDT\par \par \par \par SIGNED BY: Candace Crowe M.D., Ext. [phone] 08:41 AM

## 2023-07-24 NOTE — PHYSICAL EXAM
[FreeTextEntry1] : PE:\par Constitutional: In NAD, calm and cooperative\par MSK:\par 	Inspection: no gross swelling identified\par 	Palpation:  Mild TTP of the left lower lumbar paraspinals and over L PSIS, Minimal TTP down L IT band, moderate TTP over L GTB\par 	Strength: 5/5 strength in bilateral lower extremities except for 4/5 strength in L dorsiflexion\par 	Reflexes: 1+ Patella reflex bilaterally, 1+ Achilles reflex bilaterally, negative clonus bilaterally\par 	Sensation: Intact to light touch in bilateral lower extremities\par Special tests:\par MARIZA: positive on left\par FADIR: negative on left\par SLR: negative on the left\par \par Gait: mild L foot drop appreciated

## 2023-07-24 NOTE — ASSESSMENT
[FreeTextEntry1] : Ms. ADILSON GARNER is a 83 year old female who presents with low back and L hip pain. Low back pain is likely from her underlying stenosis and her L hip pain is likely from GTB. She now has a gradual onset L foot drop starting in early 6/2023.  Denies any bowel/bladder issues. Will recommend:\par - New MRI L Spine reviewed\par - Continue Tylenol, up to 3g/day for which she agreed. \par - She would hold off on GTB CSI for now\par - Given her new L dorsiflexion weakness, will order EMG/NCS of her LLE to assess for a radiculopathy\par - Patient to speak with physical therapist about utility of an AFO\par - Advised patient to return to neurologist for evaluation given hx of CVA in past\par \par \par Return after neurology evaluation. Patient aware of red flag signs including any changes to their bowel/bladder control, groin numbness or new weakness. Patient knows to seek immediate attention by calling 911 or going to nearest ER if these symptoms appear. \par \par \par \par

## 2023-07-24 NOTE — HISTORY OF PRESENT ILLNESS
[FreeTextEntry1] : Ms. ADILSON GARNER is a 83 year old  female who presents for follow up. At last visit, she was continued on Tylenol, was going to try lidocaine patches and spoke about a GTB CSI but decided to hold off. Since last visit,  her pain has improved  but has noticed a gradual weakness in her L foot starting in early 6/2023. Denies any other new symptoms. Patient underwent new MRI L Spine showing multilevel nerve impingements. She still has lateral L hip pain but currently better controlled. \par \par Location:Minimal in left lower back/buttock, mild along L lateral leg\par Onset:Chronic, on and off for a few years, but in mid 10/2022, pain started after cleaning the bathroom. L foot weakness started in early 6/2023. \par Provocation/Palliative:Worse with movement and standing, improved with gabapentin\par Quality:Sharp pain\par Radiation:No radiation at this time\par Severity:Minimal now\par \par No bowel/bladder changes. No groin numbness.

## 2023-08-14 ENCOUNTER — RX RENEWAL (OUTPATIENT)
Age: 83
End: 2023-08-14

## 2023-09-08 ENCOUNTER — APPOINTMENT (OUTPATIENT)
Dept: PHYSICAL MEDICINE AND REHAB | Facility: CLINIC | Age: 83
End: 2023-09-08

## 2023-09-11 ENCOUNTER — APPOINTMENT (OUTPATIENT)
Dept: FAMILY MEDICINE | Facility: CLINIC | Age: 83
End: 2023-09-11
Payer: MEDICARE

## 2023-09-11 VITALS
HEIGHT: 61 IN | SYSTOLIC BLOOD PRESSURE: 138 MMHG | WEIGHT: 130 LBS | HEART RATE: 52 BPM | OXYGEN SATURATION: 100 % | TEMPERATURE: 97.8 F | BODY MASS INDEX: 24.55 KG/M2 | DIASTOLIC BLOOD PRESSURE: 80 MMHG

## 2023-09-11 DIAGNOSIS — H91.90 UNSPECIFIED HEARING LOSS, UNSPECIFIED EAR: ICD-10-CM

## 2023-09-11 DIAGNOSIS — I61.9 NONTRAUMATIC INTRACEREBRAL HEMORRHAGE, UNSPECIFIED: ICD-10-CM

## 2023-09-11 DIAGNOSIS — R53.83 OTHER FATIGUE: ICD-10-CM

## 2023-09-11 DIAGNOSIS — E34.9 ENDOCRINE DISORDER, UNSPECIFIED: ICD-10-CM

## 2023-09-11 DIAGNOSIS — R63.4 ABNORMAL WEIGHT LOSS: ICD-10-CM

## 2023-09-11 PROCEDURE — 99215 OFFICE O/P EST HI 40 MIN: CPT

## 2023-09-12 ENCOUNTER — LABORATORY RESULT (OUTPATIENT)
Age: 83
End: 2023-09-12

## 2023-09-13 ENCOUNTER — RESULT REVIEW (OUTPATIENT)
Age: 83
End: 2023-09-13

## 2023-09-18 ENCOUNTER — OUTPATIENT (OUTPATIENT)
Dept: OUTPATIENT SERVICES | Facility: HOSPITAL | Age: 83
LOS: 1 days | End: 2023-09-18
Payer: MEDICARE

## 2023-09-18 ENCOUNTER — APPOINTMENT (OUTPATIENT)
Dept: CT IMAGING | Facility: CLINIC | Age: 83
End: 2023-09-18
Payer: MEDICARE

## 2023-09-18 DIAGNOSIS — R63.4 ABNORMAL WEIGHT LOSS: ICD-10-CM

## 2023-09-18 PROCEDURE — 71250 CT THORAX DX C-: CPT

## 2023-09-18 PROCEDURE — 74176 CT ABD & PELVIS W/O CONTRAST: CPT

## 2023-09-18 PROCEDURE — 71250 CT THORAX DX C-: CPT | Mod: 26,MH

## 2023-09-18 PROCEDURE — 74176 CT ABD & PELVIS W/O CONTRAST: CPT | Mod: 26,MH

## 2023-09-22 LAB
25(OH)D3 SERPL-MCNC: 42.5 NG/ML
ALBUMIN SERPL ELPH-MCNC: 4.1 G/DL
ALP BLD-CCNC: 119 U/L
ALT SERPL-CCNC: 12 U/L
ANION GAP SERPL CALC-SCNC: 11 MMOL/L
APPEARANCE: CLEAR
AST SERPL-CCNC: 17 U/L
BASOPHILS # BLD AUTO: 0.08 K/UL
BASOPHILS NFR BLD AUTO: 1.5 %
BILIRUB SERPL-MCNC: 0.6 MG/DL
BILIRUBIN URINE: NEGATIVE
BLOOD URINE: NEGATIVE
BUN SERPL-MCNC: 21 MG/DL
CALCIUM SERPL-MCNC: 9.7 MG/DL
CALCIUM SERPL-MCNC: 9.7 MG/DL
CHLORIDE SERPL-SCNC: 110 MMOL/L
CHOLEST SERPL-MCNC: 139 MG/DL
CO2 SERPL-SCNC: 18 MMOL/L
COLOR: YELLOW
CREAT SERPL-MCNC: 2.03 MG/DL
EGFR: 24 ML/MIN/1.73M2
EOSINOPHIL # BLD AUTO: 0.39 K/UL
EOSINOPHIL NFR BLD AUTO: 7.1 %
ESTIMATED AVERAGE GLUCOSE: 120 MG/DL
FERRITIN SERPL-MCNC: 190 NG/ML
FOLATE SERPL-MCNC: 6.4 NG/ML
GLUCOSE QUALITATIVE U: NEGATIVE MG/DL
GLUCOSE SERPL-MCNC: 116 MG/DL
HBA1C MFR BLD HPLC: 5.8 %
HCT VFR BLD CALC: 37.9 %
HDLC SERPL-MCNC: 47 MG/DL
HGB BLD-MCNC: 11.1 G/DL
IMM GRANULOCYTES NFR BLD AUTO: 0 %
IRON SATN MFR SERPL: 42 %
IRON SERPL-MCNC: 86 UG/DL
KETONES URINE: NEGATIVE MG/DL
LDLC SERPL CALC-MCNC: 74 MG/DL
LEUKOCYTE ESTERASE URINE: NEGATIVE
LYMPHOCYTES # BLD AUTO: 1.5 K/UL
LYMPHOCYTES NFR BLD AUTO: 27.3 %
MAN DIFF?: NORMAL
MCHC RBC-ENTMCNC: 28 PG
MCHC RBC-ENTMCNC: 29.3 GM/DL
MCV RBC AUTO: 95.5 FL
MONOCYTES # BLD AUTO: 0.46 K/UL
MONOCYTES NFR BLD AUTO: 8.4 %
NEUTROPHILS # BLD AUTO: 3.07 K/UL
NEUTROPHILS NFR BLD AUTO: 55.7 %
NITRITE URINE: NEGATIVE
NONHDLC SERPL-MCNC: 92 MG/DL
PARATHYROID HORMONE INTACT: 109 PG/ML
PH URINE: 6
PLATELET # BLD AUTO: 220 K/UL
POTASSIUM SERPL-SCNC: 4.1 MMOL/L
PROT SERPL-MCNC: 6.7 G/DL
PROTEIN URINE: 30 MG/DL
RBC # BLD: 3.97 M/UL
RBC # FLD: 15 %
SODIUM SERPL-SCNC: 139 MMOL/L
SPECIFIC GRAVITY URINE: 1.01
TIBC SERPL-MCNC: 204 UG/DL
TRIGL SERPL-MCNC: 99 MG/DL
TSH SERPL-ACNC: 2.28 UIU/ML
UIBC SERPL-MCNC: 118 UG/DL
URATE SERPL-MCNC: 6.8 MG/DL
UROBILINOGEN URINE: 0.2 MG/DL
VIT B12 SERPL-MCNC: 683 PG/ML
WBC # FLD AUTO: 5.5 K/UL

## 2023-09-26 PROBLEM — I61.9 HEMORRHAGIC STROKE: Status: RESOLVED | Noted: 2023-01-20 | Resolved: 2023-03-29

## 2023-09-26 PROBLEM — H91.90 HEARING LOSS: Status: ACTIVE | Noted: 2022-02-10

## 2023-10-13 ENCOUNTER — APPOINTMENT (OUTPATIENT)
Dept: PHYSICAL MEDICINE AND REHAB | Facility: CLINIC | Age: 83
End: 2023-10-13

## 2024-01-03 ENCOUNTER — RX RENEWAL (OUTPATIENT)
Age: 84
End: 2024-01-03

## 2024-01-03 RX ORDER — METOPROLOL SUCCINATE 100 MG/1
100 TABLET, EXTENDED RELEASE ORAL DAILY
Qty: 90 | Refills: 1 | Status: ACTIVE | COMMUNITY
Start: 2023-01-24 | End: 1900-01-01

## 2024-02-06 NOTE — REVIEW OF SYSTEMS
[Abdominal Pain] : no abdominal pain [Nausea] : no nausea [Constipation] : no constipation [Diarrhea] : diarrhea [Vomiting] : no vomiting [Heartburn] : heartburn [Melena] : no melena [Negative] : Psychiatric [email or web address]

## 2024-06-04 ENCOUNTER — APPOINTMENT (OUTPATIENT)
Dept: FAMILY MEDICINE | Facility: CLINIC | Age: 84
End: 2024-06-04
Payer: MEDICARE

## 2024-06-04 VITALS
HEART RATE: 63 BPM | BODY MASS INDEX: 24.35 KG/M2 | SYSTOLIC BLOOD PRESSURE: 136 MMHG | HEIGHT: 61 IN | OXYGEN SATURATION: 98 % | DIASTOLIC BLOOD PRESSURE: 80 MMHG | WEIGHT: 129 LBS

## 2024-06-04 VITALS — DIASTOLIC BLOOD PRESSURE: 80 MMHG | SYSTOLIC BLOOD PRESSURE: 130 MMHG

## 2024-06-04 DIAGNOSIS — I10 ESSENTIAL (PRIMARY) HYPERTENSION: ICD-10-CM

## 2024-06-04 DIAGNOSIS — E78.5 HYPERLIPIDEMIA, UNSPECIFIED: ICD-10-CM

## 2024-06-04 DIAGNOSIS — N18.9 CHRONIC KIDNEY DISEASE, UNSPECIFIED: ICD-10-CM

## 2024-06-04 DIAGNOSIS — Z00.00 ENCOUNTER FOR GENERAL ADULT MEDICAL EXAMINATION W/OUT ABNORMAL FINDINGS: ICD-10-CM

## 2024-06-04 PROCEDURE — G0439: CPT

## 2024-06-04 RX ORDER — MIRTAZAPINE 7.5 MG/1
7.5 TABLET, FILM COATED ORAL
Qty: 30 | Refills: 0 | Status: COMPLETED | COMMUNITY
Start: 2023-09-22 | End: 2024-06-04

## 2024-06-04 RX ORDER — FUROSEMIDE 20 MG/1
20 TABLET ORAL
Qty: 90 | Refills: 3 | Status: COMPLETED | COMMUNITY
Start: 2019-10-23 | End: 2024-06-04

## 2024-06-04 RX ORDER — DIAZEPAM 5 MG/1
5 TABLET ORAL
Qty: 1 | Refills: 0 | Status: COMPLETED | COMMUNITY
Start: 2022-12-15 | End: 2024-06-04

## 2024-06-04 RX ORDER — POTASSIUM CHLORIDE 1500 MG/1
20 TABLET, FILM COATED, EXTENDED RELEASE ORAL
Qty: 90 | Refills: 3 | Status: COMPLETED | COMMUNITY
Start: 2019-10-23 | End: 2024-06-04

## 2024-06-04 RX ORDER — NIFEDIPINE 90 MG/1
90 TABLET, EXTENDED RELEASE ORAL
Qty: 90 | Refills: 3 | Status: COMPLETED | COMMUNITY
Start: 2023-01-24 | End: 2024-06-04

## 2024-06-04 RX ORDER — ATORVASTATIN CALCIUM 40 MG/1
40 TABLET, FILM COATED ORAL DAILY
Qty: 90 | Refills: 3 | Status: ACTIVE | COMMUNITY
Start: 2023-01-24 | End: 1900-01-01

## 2024-06-06 PROBLEM — E78.5 HLD (HYPERLIPIDEMIA): Status: ACTIVE | Noted: 2017-08-31

## 2024-06-06 PROBLEM — N18.9 CHRONIC RENAL INSUFFICIENCY: Status: ACTIVE | Noted: 2021-05-11

## 2024-06-06 NOTE — PLAN
[FreeTextEntry1] : -General bloodwork done.  -Will refill her Atorvastatin medication.  -Recommended the patient go for a bone density exam to examine any signs of osteoporosis / osteopenia, or any signs of degradation in bone density. Will give the patient a script for a bone density test to examine any changes in her bone mass and assess fracture risk.  -Discussed the flu vaccine to be administered in the Fall, recommend that if the patient starts to experience flu like symptoms, such as a cold, fever, or chills that she follow-up immediately for treatment of Tamiflu.  -Will follow-up with the patient in 1 year or as needed.

## 2024-06-06 NOTE — HISTORY OF PRESENT ILLNESS
[de-identified] : ADILSON GARNER is an 84-year-old female being seen for an annual wellness visit. Mood is good. Pt is currently taking Atorvastatin 40mg, Diazepam 5mg, Fish Oil, Flax seed oil, Metoprolol ER 100mg, Nifedipine ER 90mg, Potassium Chloride ER 20meq, and Vitamin D [id number]. Pt confirms bone density exams in the past, is unsure of when or where. Would prefer to avoid bone density medication. She is currently following up with the cardiologist, Dr. Boone; she will be switching to Dr. Packer for further visits. Denies any recent falls or trauma. She has no complaints.

## 2024-06-06 NOTE — ADDENDUM
[FreeTextEntry1] : This note was written by Kala Reyna, acting as the  for Dr. Hughes. This note accurately reflects the work and decisions made by Dr. Hughes.

## 2024-06-06 NOTE — HEALTH RISK ASSESSMENT
[Good] : ~his/her~  mood as  good [No] : In the past 12 months have you used drugs other than those required for medical reasons? No [No falls in past year] : Patient reported no falls in the past year [Assistive Device] : Patient uses an assistive device [0] : 2) Feeling down, depressed, or hopeless: Not at all (0) [PHQ-2 Negative - No further assessment needed] : PHQ-2 Negative - No further assessment needed [Never] : Never [None] : None [With Family] : lives with family [Retired] : retired [] :  [Feels Safe at Home] : Feels safe at home [Fully functional (bathing, dressing, toileting, transferring, walking, feeding)] : Fully functional (bathing, dressing, toileting, transferring, walking, feeding) [Fully functional (using the telephone, shopping, preparing meals, housekeeping, doing laundry, using] : Fully functional and needs no help or supervision to perform IADLs (using the telephone, shopping, preparing meals, housekeeping, doing laundry, using transportation, managing medications and managing finances) [Smoke Detector] : smoke detector [de-identified] : Cane / Walker.  [[id number]] : 0 [Change in mental status noted] : No change in mental status noted [Reports changes in hearing] : Reports no changes in hearing [Reports changes in vision] : Reports no changes in vision [Reports normal functional visual acuity (ie: able to read med bottle)] : Reports poor functional visual acuity.  [Reports changes in dental health] : Reports no changes in dental health

## 2024-06-11 ENCOUNTER — APPOINTMENT (OUTPATIENT)
Age: 84
End: 2024-06-11

## 2024-06-11 LAB
25(OH)D3 SERPL-MCNC: 31.9 NG/ML
ALBUMIN SERPL ELPH-MCNC: 4.1 G/DL
ALP BLD-CCNC: 138 U/L
ALT SERPL-CCNC: 17 U/L
ANION GAP SERPL CALC-SCNC: 10 MMOL/L
AST SERPL-CCNC: 18 U/L
BASOPHILS # BLD AUTO: 0.1 K/UL
BASOPHILS NFR BLD AUTO: 1.7 %
BILIRUB SERPL-MCNC: 0.6 MG/DL
BUN SERPL-MCNC: 24 MG/DL
CALCIUM SERPL-MCNC: 9.7 MG/DL
CHLORIDE SERPL-SCNC: 111 MMOL/L
CHOLEST SERPL-MCNC: 140 MG/DL
CO2 SERPL-SCNC: 19 MMOL/L
CREAT SERPL-MCNC: 1.89 MG/DL
EGFR: 26 ML/MIN/1.73M2
EOSINOPHIL # BLD AUTO: 0.46 K/UL
EOSINOPHIL NFR BLD AUTO: 7.9 %
ESTIMATED AVERAGE GLUCOSE: 123 MG/DL
GLUCOSE SERPL-MCNC: 127 MG/DL
HBA1C MFR BLD HPLC: 5.9 %
HCT VFR BLD CALC: 38.2 %
HDLC SERPL-MCNC: 52 MG/DL
HGB BLD-MCNC: 10.8 G/DL
IMM GRANULOCYTES NFR BLD AUTO: 0.2 %
LDLC SERPL CALC-MCNC: 70 MG/DL
LYMPHOCYTES # BLD AUTO: 1.28 K/UL
LYMPHOCYTES NFR BLD AUTO: 21.9 %
MAN DIFF?: NORMAL
MCHC RBC-ENTMCNC: 27.5 PG
MCHC RBC-ENTMCNC: 28.3 GM/DL
MCV RBC AUTO: 97.2 FL
MONOCYTES # BLD AUTO: 0.46 K/UL
MONOCYTES NFR BLD AUTO: 7.9 %
NEUTROPHILS # BLD AUTO: 3.54 K/UL
NEUTROPHILS NFR BLD AUTO: 60.4 %
NONHDLC SERPL-MCNC: 88 MG/DL
PLATELET # BLD AUTO: 200 K/UL
POTASSIUM SERPL-SCNC: 4.7 MMOL/L
PROT SERPL-MCNC: 6.5 G/DL
RBC # BLD: 3.93 M/UL
RBC # FLD: 15.2 %
SODIUM SERPL-SCNC: 140 MMOL/L
T4 SERPL-MCNC: 5.9 UG/DL
TRIGL SERPL-MCNC: 96 MG/DL
TSH SERPL-ACNC: 2.22 UIU/ML
URATE SERPL-MCNC: 5.9 MG/DL
WBC # FLD AUTO: 5.85 K/UL

## 2024-11-04 ENCOUNTER — RX RENEWAL (OUTPATIENT)
Age: 84
End: 2024-11-04

## 2025-06-09 ENCOUNTER — APPOINTMENT (OUTPATIENT)
Dept: FAMILY MEDICINE | Facility: CLINIC | Age: 85
End: 2025-06-09